# Patient Record
Sex: FEMALE | Race: OTHER | HISPANIC OR LATINO | URBAN - METROPOLITAN AREA
[De-identification: names, ages, dates, MRNs, and addresses within clinical notes are randomized per-mention and may not be internally consistent; named-entity substitution may affect disease eponyms.]

---

## 2017-03-07 ENCOUNTER — EMERGENCY (EMERGENCY)
Facility: HOSPITAL | Age: 67
LOS: 1 days | Discharge: LEFT BEFORE DISCHARGE | End: 2017-03-07
Admitting: EMERGENCY MEDICINE

## 2017-03-07 NOTE — ED ADULT NURSE NOTE - CAS ED LWBS REASON YN
patient was registered and when called to be triaged patient did not answer.  Patient was called 2 times by RN crissy and third time patient was called with again no answer.

## 2017-03-08 PROBLEM — Z00.00 ENCOUNTER FOR PREVENTIVE HEALTH EXAMINATION: Status: ACTIVE | Noted: 2017-03-08

## 2017-04-03 ENCOUNTER — APPOINTMENT (OUTPATIENT)
Dept: ORTHOPEDIC SURGERY | Facility: CLINIC | Age: 67
End: 2017-04-03

## 2017-04-21 ENCOUNTER — OUTPATIENT (OUTPATIENT)
Dept: OUTPATIENT SERVICES | Facility: HOSPITAL | Age: 67
LOS: 1 days | End: 2017-04-21
Payer: MEDICAID

## 2017-04-21 DIAGNOSIS — Z51.89 ENCOUNTER FOR OTHER SPECIFIED AFTERCARE: ICD-10-CM

## 2017-04-21 DIAGNOSIS — S52.91XD UNSPECIFIED FRACTURE OF RIGHT FOREARM, SUBSEQUENT ENCOUNTER FOR CLOSED FRACTURE WITH ROUTINE HEALING: ICD-10-CM

## 2017-04-22 DIAGNOSIS — M79.641 PAIN IN RIGHT HAND: ICD-10-CM

## 2017-04-22 DIAGNOSIS — M25.531 PAIN IN RIGHT WRIST: ICD-10-CM

## 2017-04-22 DIAGNOSIS — M25.631 STIFFNESS OF RIGHT WRIST, NOT ELSEWHERE CLASSIFIED: ICD-10-CM

## 2021-04-20 ENCOUNTER — APPOINTMENT (OUTPATIENT)
Dept: GASTROENTEROLOGY | Facility: CLINIC | Age: 71
End: 2021-04-20
Payer: COMMERCIAL

## 2021-04-20 VITALS
DIASTOLIC BLOOD PRESSURE: 70 MMHG | WEIGHT: 140 LBS | SYSTOLIC BLOOD PRESSURE: 120 MMHG | RESPIRATION RATE: 12 BRPM | TEMPERATURE: 97.8 F | BODY MASS INDEX: 28.22 KG/M2 | HEIGHT: 59 IN | HEART RATE: 68 BPM | OXYGEN SATURATION: 98 %

## 2021-04-20 DIAGNOSIS — Z80.0 FAMILY HISTORY OF MALIGNANT NEOPLASM OF DIGESTIVE ORGANS: ICD-10-CM

## 2021-04-20 DIAGNOSIS — Z78.9 OTHER SPECIFIED HEALTH STATUS: ICD-10-CM

## 2021-04-20 DIAGNOSIS — R19.8 OTHER SPECIFIED SYMPTOMS AND SIGNS INVOLVING THE DIGESTIVE SYSTEM AND ABDOMEN: ICD-10-CM

## 2021-04-20 DIAGNOSIS — Z12.11 ENCOUNTER FOR SCREENING FOR MALIGNANT NEOPLASM OF COLON: ICD-10-CM

## 2021-04-20 DIAGNOSIS — Z86.79 PERSONAL HISTORY OF OTHER DISEASES OF THE CIRCULATORY SYSTEM: ICD-10-CM

## 2021-04-20 PROCEDURE — 99072 ADDL SUPL MATRL&STAF TM PHE: CPT

## 2021-04-20 PROCEDURE — 99204 OFFICE O/P NEW MOD 45 MIN: CPT

## 2021-04-20 NOTE — REASON FOR VISIT
[Initial Evaluation] : an initial evaluation [FreeTextEntry1] : For rectal bleeding and screening colonoscopy evaluation

## 2021-04-20 NOTE — ASSESSMENT
[FreeTextEntry1] : Impression: Low-volume hematochezia in a patient with no recent screening colonoscopies rule out colorectal neoplasm versus possible internal hemorrhoidal bleeding.  Patient had no perianal pathology noted on today's digital rectal examination.\par \par Recommendations: Repeat high risk screening colonoscopy was advised for further evaluation of the above.  The risk versus benefits of repeat colonoscopy and intravenous sedation, and alternative testing such as virtual colonoscopy, were individually explained to the patient today in Swedish by myself who speaks Swedish.  She appeared to understand all of the above and was agreeable to proceeding with repeat colonoscopy.  Her ASA classification is 2.  She will be prepped with MiraLAX and Dulcolax tablets and is medically optimized for the proposed colonoscopy and appeared to understand all of the above instructions, information, and management plan.

## 2021-04-20 NOTE — REVIEW OF SYSTEMS
[Negative] : Heme/Lymph [As Noted in HPI] : as noted in HPI [Abdominal Pain] : no abdominal pain [Vomiting] : no vomiting [Constipation] : no constipation [Diarrhea] : no diarrhea [Heartburn] : no heartburn [Melena] : no melena [FreeTextEntry7] : Low-volume hematochezia

## 2021-04-20 NOTE — PHYSICAL EXAM
[General Appearance - Alert] : alert [General Appearance - In No Acute Distress] : in no acute distress [General Appearance - Well Nourished] : well nourished [General Appearance - Well Developed] : well developed [General Appearance - Well-Appearing] : healthy appearing [Sclera] : the sclera and conjunctiva were normal [PERRL With Normal Accommodation] : pupils were equal in size, round, and reactive to light [Extraocular Movements] : extraocular movements were intact [Outer Ear] : the ears and nose were normal in appearance [Oropharynx] : the oropharynx was normal [Neck Appearance] : the appearance of the neck was normal [Neck Cervical Mass (___cm)] : no neck mass was observed [Jugular Venous Distention Increased] : there was no jugular-venous distention [Thyroid Diffuse Enlargement] : the thyroid was not enlarged [Thyroid Nodule] : there were no palpable thyroid nodules [Auscultation Breath Sounds / Voice Sounds] : lungs were clear to auscultation bilaterally [Heart Rate And Rhythm] : heart rate was normal and rhythm regular [Heart Sounds] : normal S1 and S2 [Heart Sounds Gallop] : no gallops [Murmurs] : no murmurs [Heart Sounds Pericardial Friction Rub] : no pericardial rub [Bowel Sounds] : normal bowel sounds [Abdomen Soft] : soft [Abdomen Tenderness] : non-tender [] : no hepato-splenomegaly [Abdomen Mass (___ Cm)] : no abdominal mass palpated [Normal Sphincter Tone] : normal sphincter tone [No Rectal Mass] : no rectal mass [No CVA Tenderness] : no ~M costovertebral angle tenderness [Abnormal Walk] : normal gait [Skin Color & Pigmentation] : normal skin color and pigmentation [Musculoskeletal - Swelling] : no joint swelling seen [Skin Turgor] : normal skin turgor [Oriented To Time, Place, And Person] : oriented to person, place, and time [Internal Hemorrhoid] : no internal hemorrhoids [External Hemorrhoid] : no external hemorrhoids [Occult Blood Positive] : stool was negative for occult blood [FreeTextEntry1] : Brown Hemoccult negative stool.  No blood.  The exam was chaperoned.

## 2021-04-20 NOTE — HISTORY OF PRESENT ILLNESS
[None] : had no significant interval events [Heartburn] : denies heartburn [Nausea] : denies nausea [Vomiting] : denies vomiting [Diarrhea] : denies diarrhea [Constipation] : denies constipation [Yellow Skin Or Eyes (Jaundice)] : denies jaundice [Abdominal Pain] : denies abdominal pain [Abdominal Swelling] : denies abdominal swelling [Rectal Pain] : denies rectal pain [Wt Gain ___ Lbs] : no recent weight gain [Wt Loss ___ Lbs] : no recent weight loss [GERD] : no gastroesophageal reflux disease [Hiatus Hernia] : no hiatus hernia [Peptic Ulcer Disease] : no peptic ulcer disease [Pancreatitis] : no pancreatitis [Cholelithiasis] : no cholelithiasis [Kidney Stone] : no kidney stone [Inflammatory Bowel Disease] : no inflammatory bowel disease [Irritable Bowel Syndrome] : no irritable bowel syndrome [Diverticulitis] : no diverticulitis [Alcohol Abuse] : no alcohol abuse [Malignancy] : no malignancy [Abdominal Surgery] : no abdominal surgery [Appendectomy] : no appendectomy [Cholecystectomy] : no cholecystectomy [de-identified] : This is the patient's first visit here [de-identified] : Patient presents for initial evaluation of intermittent low-volume hematochezia and for screening colonoscopy evaluation.  She states that her last colonoscopy was done more than 15 years ago and has intermittent low-volume hematochezia with no change in bowel habits or associated abdominal or rectal pain or nausea or vomiting or anorexia or unexplained weight loss.  She has no first-degree relatives with either colon cancer or colon polyps. [de-identified] : Rectal bleeding

## 2021-05-13 DIAGNOSIS — Z01.818 ENCOUNTER FOR OTHER PREPROCEDURAL EXAMINATION: ICD-10-CM

## 2021-05-18 ENCOUNTER — APPOINTMENT (OUTPATIENT)
Dept: DISASTER EMERGENCY | Facility: CLINIC | Age: 71
End: 2021-05-18

## 2021-05-19 LAB — SARS-COV-2 N GENE NPH QL NAA+PROBE: NOT DETECTED

## 2021-05-21 ENCOUNTER — APPOINTMENT (OUTPATIENT)
Dept: GASTROENTEROLOGY | Facility: GI CENTER | Age: 71
End: 2021-05-21
Payer: COMMERCIAL

## 2021-05-21 ENCOUNTER — OUTPATIENT (OUTPATIENT)
Dept: OUTPATIENT SERVICES | Facility: HOSPITAL | Age: 71
LOS: 1 days | End: 2021-05-21
Payer: COMMERCIAL

## 2021-05-21 DIAGNOSIS — K62.5 HEMORRHAGE OF ANUS AND RECTUM: ICD-10-CM

## 2021-05-21 DIAGNOSIS — Z12.11 ENCOUNTER FOR SCREENING FOR MALIGNANT NEOPLASM OF COLON: ICD-10-CM

## 2021-05-21 DIAGNOSIS — K59.00 CONSTIPATION, UNSPECIFIED: ICD-10-CM

## 2021-05-21 DIAGNOSIS — K64.8 OTHER HEMORRHOIDS: ICD-10-CM

## 2021-05-21 PROCEDURE — 45378 DIAGNOSTIC COLONOSCOPY: CPT

## 2021-05-21 PROCEDURE — 45378 DIAGNOSTIC COLONOSCOPY: CPT | Mod: 33

## 2021-05-21 NOTE — PROCEDURE
[Hematochezia] : hematochezia [Change in Bowel Habits] : change in bowel habits [Procedure Explained] : The procedure was explained [Allergies Reviewed] : allergies reviewed. [Risks] : Risks [Benefits] : benefits [Alternatives] : alternatives [Bleeding] : bleeding risk [Infection] : risk of infection [Consent Obtained] : written consent was obtained prior to the procedure and is detailed in the patient's record [Patient] : the patient [Bowel Prep Kit] : the patient took the appropriate bowel preparation kit as directed [Approved Diet Followed] : the patient avoided solid foods and adhered to the approved diet list for 24 hours prior to the procedure [Automated Blood Pressure Cuff] : automated blood pressure cuff [Cardiac Monitor] : cardiac monitor [Pulse Oximeter] : pulse oximeter [Propofol ___ mg IV] : Propofol [unfilled] ~Umg intravenously [___ L/min Oxygen via NC] : [unfilled] ~Uliters/minute oxygen via nasal cannula [2] : 2 [Sedation Clearance] : the patient was cleared for moderate sedation [Time started: ___] : Start Time:  [unfilled] [Prep Qualtiy: ___] : Prep Quality:  [unfilled] [Withdrawal Time: ___] : Withdrawal Time:  [unfilled] [Time Completed: ___] : Completion Time:  [unfilled] [Performed By: ___] : Performed by:  TRI [Left Lateral Decubitus] : The patient was positioned in the left lateral decubitus position [Cecum (Landmarks/Transillum)] : and guided to the cecum which was identified by the anatomic landmarks of the appendiceal orifice and ileocecal valve and by transillumination in the right lower quadrant [No Difficulty] : without difficulty [Insufflated] : insufflated [Single Pass Needed] : after a single pass [Retroflex View] : a retroflex view of the rectum was performed [Normal] : Normal [Hemorrhoids] : hemorrhoids [Tolerated Well] : the patient tolerated the procedure well [Vital Signs Stable] : the vital signs were stable [No Complications] : There were no complications [Abnormal Rectum] : a normal rectum [External Hemorrhoids] : no external hemorrhoids [Patient Rotated Into Alternating Positions] : the patient was not rotated [de-identified] : [facility] [address] DL [id number] [de-identified] : Internal hemorrhoids noted on retroflexion. [de-identified] : Internal hemorrhoids o/w normal colonoscopy to the cecum.

## 2021-05-21 NOTE — PROCEDURE
[Hematochezia] : hematochezia [Change in Bowel Habits] : change in bowel habits [Procedure Explained] : The procedure was explained [Allergies Reviewed] : allergies reviewed. [Risks] : Risks [Benefits] : benefits [Alternatives] : alternatives [Bleeding] : bleeding risk [Infection] : risk of infection [Consent Obtained] : written consent was obtained prior to the procedure and is detailed in the patient's record [Patient] : the patient [Bowel Prep Kit] : the patient took the appropriate bowel preparation kit as directed [Approved Diet Followed] : the patient avoided solid foods and adhered to the approved diet list for 24 hours prior to the procedure [Automated Blood Pressure Cuff] : automated blood pressure cuff [Cardiac Monitor] : cardiac monitor [Pulse Oximeter] : pulse oximeter [Propofol ___ mg IV] : Propofol [unfilled] ~Umg intravenously [___ L/min Oxygen via NC] : [unfilled] ~Uliters/minute oxygen via nasal cannula [2] : 2 [Sedation Clearance] : the patient was cleared for moderate sedation [Time started: ___] : Start Time:  [unfilled] [Prep Qualtiy: ___] : Prep Quality:  [unfilled] [Withdrawal Time: ___] : Withdrawal Time:  [unfilled] [Time Completed: ___] : Completion Time:  [unfilled] [Performed By: ___] : Performed by:  TRI [Left Lateral Decubitus] : The patient was positioned in the left lateral decubitus position [Cecum (Landmarks/Transillum)] : and guided to the cecum which was identified by the anatomic landmarks of the appendiceal orifice and ileocecal valve and by transillumination in the right lower quadrant [No Difficulty] : without difficulty [Insufflated] : insufflated [Single Pass Needed] : after a single pass [Retroflex View] : a retroflex view of the rectum was performed [Normal] : Normal [Hemorrhoids] : hemorrhoids [Tolerated Well] : the patient tolerated the procedure well [Vital Signs Stable] : the vital signs were stable [No Complications] : There were no complications [Abnormal Rectum] : a normal rectum [External Hemorrhoids] : no external hemorrhoids [Patient Rotated Into Alternating Positions] : the patient was not rotated [de-identified] : [facility] [address] DL [id number] [de-identified] : Internal hemorrhoids noted on retroflexion. [de-identified] : Internal hemorrhoids o/w normal colonoscopy to the cecum.

## 2021-05-21 NOTE — REASON FOR VISIT
[Procedure: _________] : a [unfilled] procedure visit [Colonoscopy] : a colonoscopy [FreeTextEntry2] : Pt. is here for colonoscopy for rectal bleeding and constipation

## 2021-05-21 NOTE — REVIEW OF SYSTEMS
[Constipation] : constipation [Negative] : Heme/Lymph [Diarrhea] : no diarrhea [Heartburn] : no heartburn [Melena] : no melena [FreeTextEntry7] : rectal bleeding

## 2021-05-21 NOTE — PHYSICAL EXAM
[General Appearance - Alert] : alert [General Appearance - In No Acute Distress] : in no acute distress [General Appearance - Well Nourished] : well nourished [General Appearance - Well Developed] : well developed [General Appearance - Well-Appearing] : healthy appearing [Sclera] : the sclera and conjunctiva were normal [PERRL With Normal Accommodation] : pupils were equal in size, round, and reactive to light [Extraocular Movements] : extraocular movements were intact [Outer Ear] : the ears and nose were normal in appearance [Oropharynx] : the oropharynx was normal [Neck Appearance] : the appearance of the neck was normal [Neck Cervical Mass (___cm)] : no neck mass was observed [Jugular Venous Distention Increased] : there was no jugular-venous distention [Thyroid Diffuse Enlargement] : the thyroid was not enlarged [Thyroid Nodule] : there were no palpable thyroid nodules [Auscultation Breath Sounds / Voice Sounds] : lungs were clear to auscultation bilaterally [Heart Rate And Rhythm] : heart rate was normal and rhythm regular [Heart Sounds] : normal S1 and S2 [Heart Sounds Gallop] : no gallops [Murmurs] : no murmurs [Heart Sounds Pericardial Friction Rub] : no pericardial rub [Bowel Sounds] : normal bowel sounds [Abdomen Soft] : soft [Abdomen Tenderness] : non-tender [] : no hepato-splenomegaly [Abdomen Mass (___ Cm)] : no abdominal mass palpated [Normal Sphincter Tone] : normal sphincter tone [No Rectal Mass] : no rectal mass [No CVA Tenderness] : no ~M costovertebral angle tenderness [Abnormal Walk] : normal gait [Musculoskeletal - Swelling] : no joint swelling seen [Skin Color & Pigmentation] : normal skin color and pigmentation [Skin Turgor] : normal skin turgor [Oriented To Time, Place, And Person] : oriented to person, place, and time [Internal Hemorrhoid] : no internal hemorrhoids [External Hemorrhoid] : no external hemorrhoids [Occult Blood Positive] : stool was negative for occult blood [FreeTextEntry1] : Brown Hemoccult negative stool.  No blood.  The exam was chaperoned.

## 2022-08-02 ENCOUNTER — APPOINTMENT (OUTPATIENT)
Dept: GASTROENTEROLOGY | Facility: CLINIC | Age: 72
End: 2022-08-02

## 2022-08-02 PROBLEM — Z00.00 ENCOUNTER FOR PREVENTIVE HEALTH EXAMINATION: Status: ACTIVE | Noted: 2022-08-02

## 2022-09-01 ENCOUNTER — OFFICE (OUTPATIENT)
Dept: URBAN - METROPOLITAN AREA CLINIC 94 | Facility: CLINIC | Age: 72
Setting detail: OPHTHALMOLOGY
End: 2022-09-01
Payer: COMMERCIAL

## 2022-09-01 DIAGNOSIS — Z01.00: ICD-10-CM

## 2022-09-01 PROBLEM — H52.4 PRESBYOPIA: Status: ACTIVE | Noted: 2022-09-01

## 2022-09-01 PROCEDURE — 92004 COMPRE OPH EXAM NEW PT 1/>: CPT | Performed by: OPTOMETRIST

## 2022-09-01 ASSESSMENT — KERATOMETRY
OS_K2POWER_DIOPTERS: 45.25
OD_K2POWER_DIOPTERS: 46.50
OD_AXISANGLE_DEGREES: 108
OD_K1POWER_DIOPTERS: 45.25
OS_K1POWER_DIOPTERS: 44.75
OS_AXISANGLE_DEGREES: 113

## 2022-09-01 ASSESSMENT — VISUAL ACUITY
OS_BCVA: 20/25+1
OD_BCVA: 20/25-1

## 2022-09-01 ASSESSMENT — REFRACTION_MANIFEST
OS_CYLINDER: SPH
OS_VA1: 20/25
OD_SPHERE: -0.50
OS_SPHERE: -0.50
OS_ADD: +2.75
OD_ADD: +2.75
OD_VA1: 20/25
OD_CYLINDER: SPH

## 2022-09-01 ASSESSMENT — REFRACTION_AUTOREFRACTION
OS_AXIS: 054
OS_SPHERE: -0.50
OD_SPHERE: -0.50
OD_CYLINDER: -0.75
OD_AXIS: 025
OS_CYLINDER: -0.50

## 2022-09-01 ASSESSMENT — SPHEQUIV_DERIVED
OD_SPHEQUIV: -0.875
OS_SPHEQUIV: -0.75

## 2022-09-01 ASSESSMENT — CONFRONTATIONAL VISUAL FIELD TEST (CVF)
OD_FINDINGS: FULL
OS_FINDINGS: FULL

## 2022-09-01 ASSESSMENT — TONOMETRY
OD_IOP_MMHG: 17
OS_IOP_MMHG: 15

## 2022-09-01 ASSESSMENT — AXIALLENGTH_DERIVED
OD_AL: 23.0722
OS_AL: 23.336

## 2023-07-28 ENCOUNTER — INPATIENT (INPATIENT)
Facility: HOSPITAL | Age: 73
LOS: 2 days | Discharge: HOME | DRG: 83 | End: 2023-07-31
Attending: SURGERY | Admitting: SURGERY
Payer: SELF-PAY

## 2023-07-28 VITALS
DIASTOLIC BLOOD PRESSURE: 68 MMHG | TEMPERATURE: 98 F | RESPIRATION RATE: 16 BRPM | OXYGEN SATURATION: 97 % | WEIGHT: 154.98 LBS | HEART RATE: 71 BPM | SYSTOLIC BLOOD PRESSURE: 143 MMHG

## 2023-07-28 DIAGNOSIS — I60.9 NONTRAUMATIC SUBARACHNOID HEMORRHAGE, UNSPECIFIED: ICD-10-CM

## 2023-07-28 LAB
ABO RH CONFIRMATION: SIGNIFICANT CHANGE UP
ALBUMIN SERPL ELPH-MCNC: 3.9 G/DL — SIGNIFICANT CHANGE UP (ref 3.3–5.2)
ALP SERPL-CCNC: 90 U/L — SIGNIFICANT CHANGE UP (ref 40–120)
ALT FLD-CCNC: 20 U/L — SIGNIFICANT CHANGE UP
ANION GAP SERPL CALC-SCNC: 11 MMOL/L — SIGNIFICANT CHANGE UP (ref 5–17)
ANION GAP SERPL CALC-SCNC: 13 MMOL/L — SIGNIFICANT CHANGE UP (ref 5–17)
APTT BLD: 28.6 SEC — SIGNIFICANT CHANGE UP (ref 24.5–35.6)
AST SERPL-CCNC: 49 U/L — HIGH
BASOPHILS # BLD AUTO: 0.07 K/UL — SIGNIFICANT CHANGE UP (ref 0–0.2)
BASOPHILS NFR BLD AUTO: 0.4 % — SIGNIFICANT CHANGE UP (ref 0–2)
BILIRUB SERPL-MCNC: 0.6 MG/DL — SIGNIFICANT CHANGE UP (ref 0.4–2)
BLD GP AB SCN SERPL QL: SIGNIFICANT CHANGE UP
BUN SERPL-MCNC: 15.1 MG/DL — SIGNIFICANT CHANGE UP (ref 8–20)
BUN SERPL-MCNC: 16.5 MG/DL — SIGNIFICANT CHANGE UP (ref 8–20)
CALCIUM SERPL-MCNC: 8.5 MG/DL — SIGNIFICANT CHANGE UP (ref 8.4–10.5)
CALCIUM SERPL-MCNC: 8.7 MG/DL — SIGNIFICANT CHANGE UP (ref 8.4–10.5)
CHLORIDE SERPL-SCNC: 104 MMOL/L — SIGNIFICANT CHANGE UP (ref 96–108)
CHLORIDE SERPL-SCNC: 104 MMOL/L — SIGNIFICANT CHANGE UP (ref 96–108)
CO2 SERPL-SCNC: 23 MMOL/L — SIGNIFICANT CHANGE UP (ref 22–29)
CO2 SERPL-SCNC: 25 MMOL/L — SIGNIFICANT CHANGE UP (ref 22–29)
CREAT SERPL-MCNC: 0.46 MG/DL — LOW (ref 0.5–1.3)
CREAT SERPL-MCNC: 0.46 MG/DL — LOW (ref 0.5–1.3)
EGFR: 100 ML/MIN/1.73M2 — SIGNIFICANT CHANGE UP
EGFR: 100 ML/MIN/1.73M2 — SIGNIFICANT CHANGE UP
EOSINOPHIL # BLD AUTO: 0.02 K/UL — SIGNIFICANT CHANGE UP (ref 0–0.5)
EOSINOPHIL NFR BLD AUTO: 0.1 % — SIGNIFICANT CHANGE UP (ref 0–6)
GLUCOSE SERPL-MCNC: 120 MG/DL — HIGH (ref 70–99)
GLUCOSE SERPL-MCNC: 123 MG/DL — HIGH (ref 70–99)
HCT VFR BLD CALC: 38.5 % — SIGNIFICANT CHANGE UP (ref 34.5–45)
HGB BLD-MCNC: 12.1 G/DL — SIGNIFICANT CHANGE UP (ref 11.5–15.5)
IMM GRANULOCYTES NFR BLD AUTO: 0.7 % — SIGNIFICANT CHANGE UP (ref 0–0.9)
INR BLD: 1.06 RATIO — SIGNIFICANT CHANGE UP (ref 0.85–1.18)
LYMPHOCYTES # BLD AUTO: 1.1 K/UL — SIGNIFICANT CHANGE UP (ref 1–3.3)
LYMPHOCYTES # BLD AUTO: 6.3 % — LOW (ref 13–44)
MCHC RBC-ENTMCNC: 28 PG — SIGNIFICANT CHANGE UP (ref 27–34)
MCHC RBC-ENTMCNC: 31.4 GM/DL — LOW (ref 32–36)
MCV RBC AUTO: 89.1 FL — SIGNIFICANT CHANGE UP (ref 80–100)
MONOCYTES # BLD AUTO: 1.01 K/UL — HIGH (ref 0–0.9)
MONOCYTES NFR BLD AUTO: 5.8 % — SIGNIFICANT CHANGE UP (ref 2–14)
NEUTROPHILS # BLD AUTO: 15.1 K/UL — HIGH (ref 1.8–7.4)
NEUTROPHILS NFR BLD AUTO: 86.7 % — HIGH (ref 43–77)
PLATELET # BLD AUTO: 230 K/UL — SIGNIFICANT CHANGE UP (ref 150–400)
POTASSIUM SERPL-MCNC: 3.8 MMOL/L — SIGNIFICANT CHANGE UP (ref 3.5–5.3)
POTASSIUM SERPL-MCNC: 6 MMOL/L — HIGH (ref 3.5–5.3)
POTASSIUM SERPL-SCNC: 3.8 MMOL/L — SIGNIFICANT CHANGE UP (ref 3.5–5.3)
POTASSIUM SERPL-SCNC: 6 MMOL/L — HIGH (ref 3.5–5.3)
PROT SERPL-MCNC: 8.1 G/DL — SIGNIFICANT CHANGE UP (ref 6.6–8.7)
PROTHROM AB SERPL-ACNC: 11.7 SEC — SIGNIFICANT CHANGE UP (ref 9.5–13)
RBC # BLD: 4.32 M/UL — SIGNIFICANT CHANGE UP (ref 3.8–5.2)
RBC # FLD: 12.9 % — SIGNIFICANT CHANGE UP (ref 10.3–14.5)
SODIUM SERPL-SCNC: 139 MMOL/L — SIGNIFICANT CHANGE UP (ref 135–145)
SODIUM SERPL-SCNC: 140 MMOL/L — SIGNIFICANT CHANGE UP (ref 135–145)
WBC # BLD: 17.43 K/UL — HIGH (ref 3.8–10.5)
WBC # FLD AUTO: 17.43 K/UL — HIGH (ref 3.8–10.5)

## 2023-07-28 PROCEDURE — 72125 CT NECK SPINE W/O DYE: CPT | Mod: 26,MA

## 2023-07-28 PROCEDURE — 99222 1ST HOSP IP/OBS MODERATE 55: CPT

## 2023-07-28 PROCEDURE — 70450 CT HEAD/BRAIN W/O DYE: CPT | Mod: 26,77

## 2023-07-28 PROCEDURE — 71045 X-RAY EXAM CHEST 1 VIEW: CPT | Mod: 26

## 2023-07-28 PROCEDURE — 93010 ELECTROCARDIOGRAM REPORT: CPT

## 2023-07-28 PROCEDURE — 99291 CRITICAL CARE FIRST HOUR: CPT

## 2023-07-28 PROCEDURE — 70498 CT ANGIOGRAPHY NECK: CPT | Mod: 26

## 2023-07-28 PROCEDURE — 70450 CT HEAD/BRAIN W/O DYE: CPT | Mod: 26,MA

## 2023-07-28 PROCEDURE — 72170 X-RAY EXAM OF PELVIS: CPT | Mod: 26

## 2023-07-28 RX ORDER — LEVOTHYROXINE SODIUM 125 MCG
1 TABLET ORAL
Refills: 0 | DISCHARGE

## 2023-07-28 RX ORDER — POTASSIUM CHLORIDE 20 MEQ
10 PACKET (EA) ORAL
Refills: 0 | Status: COMPLETED | OUTPATIENT
Start: 2023-07-28 | End: 2023-07-28

## 2023-07-28 RX ORDER — LEVETIRACETAM 250 MG/1
1000 TABLET, FILM COATED ORAL ONCE
Refills: 0 | Status: COMPLETED | OUTPATIENT
Start: 2023-07-28 | End: 2023-07-28

## 2023-07-28 RX ORDER — ACETAMINOPHEN 500 MG
1000 TABLET ORAL EVERY 6 HOURS
Refills: 0 | Status: COMPLETED | OUTPATIENT
Start: 2023-07-28 | End: 2023-07-29

## 2023-07-28 RX ORDER — SODIUM CHLORIDE 9 MG/ML
1000 INJECTION INTRAMUSCULAR; INTRAVENOUS; SUBCUTANEOUS
Refills: 0 | Status: DISCONTINUED | OUTPATIENT
Start: 2023-07-28 | End: 2023-07-28

## 2023-07-28 RX ORDER — ACETAMINOPHEN 500 MG
975 TABLET ORAL ONCE
Refills: 0 | Status: COMPLETED | OUTPATIENT
Start: 2023-07-28 | End: 2023-07-28

## 2023-07-28 RX ORDER — LEVOTHYROXINE SODIUM 125 MCG
25 TABLET ORAL DAILY
Refills: 0 | Status: DISCONTINUED | OUTPATIENT
Start: 2023-07-28 | End: 2023-07-31

## 2023-07-28 RX ORDER — ONDANSETRON 8 MG/1
4 TABLET, FILM COATED ORAL ONCE
Refills: 0 | Status: COMPLETED | OUTPATIENT
Start: 2023-07-28 | End: 2023-07-28

## 2023-07-28 RX ORDER — LEVETIRACETAM 250 MG/1
500 TABLET, FILM COATED ORAL
Refills: 0 | Status: DISCONTINUED | OUTPATIENT
Start: 2023-07-29 | End: 2023-07-29

## 2023-07-28 RX ORDER — MAGNESIUM SULFATE 500 MG/ML
2 VIAL (ML) INJECTION ONCE
Refills: 0 | Status: COMPLETED | OUTPATIENT
Start: 2023-07-28 | End: 2023-07-28

## 2023-07-28 RX ADMIN — Medication 100 MILLIEQUIVALENT(S): at 19:28

## 2023-07-28 RX ADMIN — SODIUM CHLORIDE 75 MILLILITER(S): 9 INJECTION INTRAMUSCULAR; INTRAVENOUS; SUBCUTANEOUS at 12:53

## 2023-07-28 RX ADMIN — Medication 1000 MILLIGRAM(S): at 18:50

## 2023-07-28 RX ADMIN — ONDANSETRON 4 MILLIGRAM(S): 8 TABLET, FILM COATED ORAL at 08:51

## 2023-07-28 RX ADMIN — Medication 400 MILLIGRAM(S): at 23:53

## 2023-07-28 RX ADMIN — LEVETIRACETAM 400 MILLIGRAM(S): 250 TABLET, FILM COATED ORAL at 09:58

## 2023-07-28 RX ADMIN — Medication 100 MILLIEQUIVALENT(S): at 20:33

## 2023-07-28 RX ADMIN — Medication 975 MILLIGRAM(S): at 08:50

## 2023-07-28 RX ADMIN — Medication 25 MICROGRAM(S): at 10:00

## 2023-07-28 RX ADMIN — Medication 150 GRAM(S): at 18:58

## 2023-07-28 RX ADMIN — Medication 400 MILLIGRAM(S): at 17:59

## 2023-07-28 RX ADMIN — LEVETIRACETAM 1000 MILLIGRAM(S): 250 TABLET, FILM COATED ORAL at 12:50

## 2023-07-28 RX ADMIN — Medication 975 MILLIGRAM(S): at 08:51

## 2023-07-28 RX ADMIN — Medication 100 MILLIEQUIVALENT(S): at 21:51

## 2023-07-28 RX ADMIN — LEVETIRACETAM 400 MILLIGRAM(S): 250 TABLET, FILM COATED ORAL at 18:32

## 2023-07-28 NOTE — H&P ADULT - ATTENDING COMMENTS
I have seen and examined the patient at 1030 hrs  Details add above, ground level fall , after chocking on rice.    On evaluation.  ABCD Intact  HD normal  GCS [id number]    Images reviewed  Lyudmila, Reanna, occipital bone fx.    A/P  Fall, Reanna, Lyudmila  Admit to trauma to SICU  serial neuro exams  Repeat CT in 4-6 hrs  Agree with CTA  Neurosurgery consult.  PT  SCD, chemoprophylaxes after 24 hrs after stable CT  Plan discussed with patient and famille,  all questions answered other satisfaction.

## 2023-07-28 NOTE — CONSULT NOTE ADULT - SUBJECTIVE AND OBJECTIVE BOX
72F PMH Hypothyroid P/W following an event of chocking on rice leading to lightheadedness and pre-syncopal event landing on back of her head around 530AM. Similar event in the past on chocking on rice. + N/V x 3 over 20 mins. No LOC. + Head ache at back of head. Per Pt "felt my blood pressure fall".    PMH/PSHX/SHx: See above  No Known Allergies    Otpt Meds: Levothyroxine 25 MICROGram(s) Oral daily    VS:  T(C): 36.8 (07-28-23 @ 06:49), Max: 36.8 (07-28-23 @ 06:49)  HR: 76 (07-28-23 @ 09:29) (71 - 76)  BP: 146/69 (07-28-23 @ 09:29) (143/68 - 146/69)  RR: 18 (07-28-23 @ 09:29) (16 - 18)  SpO2: 100% (07-28-23 @ 09:29) (97% - 100%)    Exam:    Gen:  Skin:  Cardio/Lung:  Abd:  Ext:  :  Neuro:    Labs:  CTH 7/28: Acute bifrontal sulcal subarachnoid hemorrhage. Acute left frontotemporal subdural hemorrhage measures up to 7 mm in greatest depth. Thin acuteinterhemispheric subdural hemorrhage. Nondisplaced left occipital bone fracture extends to the opisthion. Tiny extra-axial hemorrhage subjacent to the fracture along the floor of the posterior fossa. No acute parenchymal hemorrhage or brain edema.    CT C spine 7/28:  Nondisplaced fracture involving the left suboccipital   calvarium.                          12.1   17.43 )-----------( 230      ( 28 Jul 2023 09:01 )             38.5     07-28    140  |  104  |  15.1  ----------------------------<  123<H>  3.8   |  25.0  |  0.46<L>    Ca    8.5      28 Jul 2023 09:50    TPro  8.1  /  Alb  3.9  /  TBili  0.6  /  DBili  x   /  AST  49<H>  /  ALT  20  /  AlkPhos  90  07-28    PT/INR - ( 28 Jul 2023 09:44 )   PT: 11.7 sec;   INR: 1.06 ratio         PTT - ( 28 Jul 2023 09:44 )  PTT:28.6 sec      A/P: 72F w/ above PMH P/W SDH /  SAH / Occipital bone fx-non-displaced 2nd to Syncopal event.    -Neuro Cks q 2  -Rpt CTH in 6 hrs than 24 hrs  -No ASA or A/C  -Trauma Consult given fall  -CTA H/N given SAH    -No acute surgical intervention at this time    Diss w/ attending 72F PMH Hypothyroid P/W following an event of chocking on rice leading to lightheadedness and pre-syncopal event landing on back of her head around 530AM. Similar event in the past on chocking on rice. + N/V x 3 over 20 mins. No LOC. + Headache at back of head. Per Pt "felt my blood pressure fall". Per Pt, usually have issues swallowing due to her thyroid issues and needs to chew food properly No CP or HA or visual issues. In ED, CTH w/ SDH / SAH / occipital bone. Labs w/ WBC 17.    GCS: 15  / Trauma SAH     PMH/PSHX/SHx: See above  No Known Allergies    Otpt Meds: Levothyroxine 25 MICROGram(s) Oral daily    VS:  T(C): 36.8 (07-28-23 @ 06:49), Max: 36.8 (07-28-23 @ 06:49)  HR: 76 (07-28-23 @ 09:29) (71 - 76)  BP: 146/69 (07-28-23 @ 09:29) (143/68 - 146/69)  RR: 18 (07-28-23 @ 09:29) (16 - 18)  SpO2: 100% (07-28-23 @ 09:29) (97% - 100%)    Exam:  Gen: NAD  Skin: Warm/Intact  Cardio/Lung: S1,2 RRR CTA, No Wheezing  Abd: S/NT/ND  Ext: No edema  : No August  Neuro: A/O x 3, Clear and Intact Speech, PEERLA/EOMI, MANSFIELD x 4 5/5, No Drift    Labs:  CTH 7/28: Acute bifrontal sulcal subarachnoid hemorrhage. Acute left frontotemporal subdural hemorrhage measures up to 7 mm in greatest depth. Thin acuteinterhemispheric subdural hemorrhage. Nondisplaced left occipital bone fracture extends to the opisthion. Tiny extra-axial hemorrhage subjacent to the fracture along the floor of the posterior fossa. No acute parenchymal hemorrhage or brain edema.    CT C spine 7/28:  Nondisplaced fracture involving the left suboccipital   calvarium.                          12.1   17.43 )-----------( 230      ( 28 Jul 2023 09:01 )             38.5     07-28    140  |  104  |  15.1  ----------------------------<  123<H>  3.8   |  25.0  |  0.46<L>    Ca    8.5      28 Jul 2023 09:50    TPro  8.1  /  Alb  3.9  /  TBili  0.6  /  DBili  x   /  AST  49<H>  /  ALT  20  /  AlkPhos  90  07-28    PT/INR - ( 28 Jul 2023 09:44 )   PT: 11.7 sec;   INR: 1.06 ratio         PTT - ( 28 Jul 2023 09:44 )  PTT:28.6 sec      A/P: 72F w/ above PMH P/W SDH /  SAH / Occipital bone fx-non-displaced 2nd to Syncopal event.    -Neuro Cks q 2  -Rpt CTH in 6 hrs than 24 hrs  -No ASA or A/C  -Keppra load than 500 q12 for 7 days  -Trauma Consult given fall  -CTA H/N given SAH    -No acute surgical intervention at this time    Diss w/ attending

## 2023-07-28 NOTE — ED PROVIDER NOTE - CLINICAL SUMMARY MEDICAL DECISION MAKING FREE TEXT BOX
Will do CT head and c-spine, and syncope work up. Likely situational syncope. Symptom management and reassess.

## 2023-07-28 NOTE — PATIENT PROFILE ADULT - FALL HARM RISK - HARM RISK INTERVENTIONS

## 2023-07-28 NOTE — H&P ADULT - ASSESSMENT
72 year old F, h/o hypothyroidism, status post fall, HS, no AC use, syncope prior to fall with:    Acute bifrontal sulcal subarachnoid hemorrhage.  Acute left frontotemporal subdural hemorrhage measures up to 7 mm in greatest depth.  Thin acute interhemispheric subdural hemorrhage.  Nondisplaced left occipital bone fracture extends to the opisthion.  Tiny extra-axial hemorrhage subjacent to the fracture along the floor of the posterior fossa    Admit to SICU  NSGY eval   Repeat CTH in 6 hours from previous   CTA Neck to rule out BCVI  VTE mechanical ppx only   Resume home meds   Repeat BMP as initial showed a potassium of 6, EKG normal   ECHO for syncope work up   CXR and PXR to rule out underlying injury

## 2023-07-28 NOTE — ED ADULT NURSE REASSESSMENT NOTE - NS ED NURSE REASSESS COMMENT FT1
Pt resting comfortably in stretcher, awaiting repeat head CT, awaiting ICU bed, pt and family aware of plan of care.

## 2023-07-28 NOTE — ED ADULT NURSE NOTE - NSFALLRISKINTERV_ED_ALL_ED

## 2023-07-28 NOTE — ED ADULT NURSE NOTE - NEURO ASSESSMENT
[facility], [facility] - Gastroenterology                                                                                                               Reason for consult: eval prior to cln, ibs, upper gi complaints    Requesting physician or provider: Hannah Stuart 2001   • Paresis of accommodation, bilateral       Past Surgical History:   Procedure Laterality Date   • LASIK Bilateral Sandra Musa MD   • OTHER SURGICAL HISTORY     • SINUS SURGERY    2016      Family Hx:   Family History   Problem R as needed. • naproxen 500 MG Oral Tab Take 500 mg by mouth as needed. • loratadine 10 MG Oral Tab Take 10 mg by mouth as needed.            Allergies:    Eucalyptus Oil          SHORTNESS OF BREATH  Pollen Extract          UNKNOWN    ROS:   CONS Neutrophil Absolute 3.98 1.50 - 7.70 x10(3) uL    Lymphocyte Absolute 2.28 1.00 - 4.00 x10(3) uL    Monocyte Absolute 0.54 0.10 - 1.00 x10(3) uL    Eosinophil Absolute 0.07 0.00 - 0.70 x10(3) uL    Basophil Absolute 0.02 0.00 - 0.20 x10(3) uL    Immature G reportedly normal.  Father had colon ca at young age so has been on 5 year interval.  She has chronic variability in bm w/ loose stools after coffee and improved with use of probiotic and suspect may be functional.  She has hemorrhoids during periods of co Placed This Encounter      CBC W Differential W Platelet      Tissue Transglutaminase Ab, IgA      Immunoglobulin A, Qn, Serum (IGA)      C-Reactive Protein      Sed Rate, Westergren (Automated)      Vitamin B12      Meds This Visit:  Requested Prescriptio - - -

## 2023-07-28 NOTE — ED ADULT TRIAGE NOTE - CHIEF COMPLAINT QUOTE
Pt. complaining states that she choked on rice and that her pressure drops and then became lightheaded and fell backwards and hit her head on the floor. Pt. states this has happened before when she choked on rice. Pt. states she threw up 3 times and one had a little bit of blood.  Pt. denies LOC, denies blood thinners. Pt. took Motrin PTA. A&Ox3., GCS 15.

## 2023-07-28 NOTE — ED ADULT NURSE REASSESSMENT NOTE - NS ED NURSE REASSESS COMMENT FT1
Report given to receiving RN, pt placed on portable monitor, pt aware of plan of care. Report given to receiving RN Kelly, pt placed on portable monitor, awaiting transport to floor, pt aware of plan of care.

## 2023-07-28 NOTE — H&P ADULT - NSHPPHYSICALEXAM_GEN_ALL_CORE
General NAD  HEENT: No obvious contusions or lacerations   CV: Normotensive   RS: Non labored, no chest wall tenderness   Abd: Soft, NT, ND, pelvis stable   CNS: Alert, OX3, GCS 15, Upper and lower motor strength intact, sensation intact

## 2023-07-28 NOTE — ED PROVIDER NOTE - CARE PLAN
1 Principal Discharge DX:	SAH (subarachnoid hemorrhage)  Secondary Diagnosis:	SDH (subdural hematoma)  Secondary Diagnosis:	Skull fracture

## 2023-07-28 NOTE — ED ADULT TRIAGE NOTE - LANGUAGE ASSISTANCE NEEDED
Yes-Patient/Caregiver accepts free interpretation services...
[FreeTextEntry2] : New patient visit for a new injury to B/L knees

## 2023-07-28 NOTE — ED PROVIDER NOTE - OBJECTIVE STATEMENT
A 73 yo F with no significant pmh, presents to the ED c/o occipital pain and nausea s/p fall 2/2 syncopal episode this morning 530 am. Reports Pt chocked on rice and felt reduced blood pressure and then passed out. Fell on the floor and hit head. No witnessed. Reports Pt vomited 3 times at about 20 min after the episode. Denies fevers, chills, chest pain, palpitations, shortness of breath, cough, diarrhea, hematuria, dysuria, dark stools, focal neurologic symptoms. Denies other pain or trauma.

## 2023-07-28 NOTE — ED ADULT NURSE NOTE - OBJECTIVE STATEMENT
Pt A&O x 3 c/o headache s/p fall. Pt reports choking on rice and falling. Presents with N/V. Pt alert, awake and following directions. GCS 15. NIH 0. Airway patent. Respirations even and unlabored. IV access obtained; specimens sent to lab. Pt on telemonitor. Bed locked and in lowest position. Call bell within reach. Family at bedside.

## 2023-07-28 NOTE — H&P ADULT - NSHPLABSRESULTS_GEN_ALL_CORE
.  LABS:                         12.1   17.43 )-----------( 230      ( 28 Jul 2023 09:01 )             38.5     07-28    139  |  104  |  16.5  ----------------------------<  120<H>  6.0<H>   |  23.0  |  0.46<L>    Ca    8.7      28 Jul 2023 09:01    TPro  8.1  /  Alb  3.9  /  TBili  0.6  /  DBili  x   /  AST  49<H>  /  ALT  20  /  AlkPhos  90  07-28      Urinalysis Basic - ( 28 Jul 2023 09:01 )    Color: x / Appearance: x / SG: x / pH: x  Gluc: 120 mg/dL / Ketone: x  / Bili: x / Urobili: x   Blood: x / Protein: x / Nitrite: x   Leuk Esterase: x / RBC: x / WBC x   Sq Epi: x / Non Sq Epi: x / Bacteria: x            RADIOLOGY, EKG & ADDITIONAL TESTS: Reviewed.     FINDINGS:    No hydrocephalus, midline shift, or effacement of the basal cisterns.    No acute parenchymal hemorrhage or brain edema. Moderate white matter microvascular ischemic disease.    Acute bifrontal sulcal subarachnoid hemorrhage.    Acute left frontotemporal subdural hemorrhage measures up to 7 mm in greatest depth. Thin acute interhemispheric subdural hemorrhage.    Nondisplaced left occipital bone fracture extends to the opisthion.    Tiny extra-axial hemorrhage subjacent to the fracture along the floor of the posterior fossa    Air-fluid levels in the sphenoid sinuses. Remaining visualized paranasal sinuses and mastoid air cells are clear.    IMPRESSION:    Acute bifrontal sulcal subarachnoid hemorrhage.    Acute left frontotemporal subdural hemorrhage measures up to 7 mm in greatest depth.    Thin acute interhemispheric subdural hemorrhage.    Nondisplaced left occipital bone fracture extends to the opisthion.    Tiny extra-axial hemorrhage subjacent to the fracture along the floor of the posterior fossa    No acute parenchymal hemorrhage or brain edema.

## 2023-07-28 NOTE — ED PROVIDER NOTE - ATTENDING CONTRIBUTION TO CARE
72F s/p fall, occipital hematoma on exam, brought to CT emergently for CT head/neck found to have subdural + SAH + occipital skull fracture. Started on keppra, ICH precautions, d/w neurosurg and trauma, admit to SICU

## 2023-07-28 NOTE — ED PROVIDER NOTE - FACE
Head, normocephalic, atraumatic, Face, Face within normal limits, Ears, External ears within normal limits
no lymph node enlargement

## 2023-07-28 NOTE — ED ADULT NURSE REASSESSMENT NOTE - NS ED NURSE REASSESS COMMENT FT1
Neuro PA at bedside for evaluation, brought to CT for CTA, family and pt aware of plan of care, awaiting bed assignment in ICU.

## 2023-07-28 NOTE — H&P ADULT - HISTORY OF PRESENT ILLNESS
72F, H/o Hypothyroidism, presenting after a fall this morning, at 530am, she syncopized prior to the fall, never had issues with syncope before, she noted that she vomited 3 times after the fall, c/o occipital pain, no blurred vision, denies any weakness, was able to get up and ambulate after the fall, denies chest or hip pain, denies neck pain.

## 2023-07-29 DIAGNOSIS — R55 SYNCOPE AND COLLAPSE: ICD-10-CM

## 2023-07-29 LAB
ANION GAP SERPL CALC-SCNC: 11 MMOL/L — SIGNIFICANT CHANGE UP (ref 5–17)
BASOPHILS # BLD AUTO: 0.04 K/UL — SIGNIFICANT CHANGE UP (ref 0–0.2)
BASOPHILS NFR BLD AUTO: 0.5 % — SIGNIFICANT CHANGE UP (ref 0–2)
BUN SERPL-MCNC: 8.2 MG/DL — SIGNIFICANT CHANGE UP (ref 8–20)
CALCIUM SERPL-MCNC: 8.1 MG/DL — LOW (ref 8.4–10.5)
CHLORIDE SERPL-SCNC: 110 MMOL/L — HIGH (ref 96–108)
CO2 SERPL-SCNC: 22 MMOL/L — SIGNIFICANT CHANGE UP (ref 22–29)
CREAT SERPL-MCNC: 0.39 MG/DL — LOW (ref 0.5–1.3)
EGFR: 104 ML/MIN/1.73M2 — SIGNIFICANT CHANGE UP
EOSINOPHIL # BLD AUTO: 0.07 K/UL — SIGNIFICANT CHANGE UP (ref 0–0.5)
EOSINOPHIL NFR BLD AUTO: 0.9 % — SIGNIFICANT CHANGE UP (ref 0–6)
GLUCOSE SERPL-MCNC: 101 MG/DL — HIGH (ref 70–99)
HCT VFR BLD CALC: 32.7 % — LOW (ref 34.5–45)
HCV AB S/CO SERPL IA: 0.11 S/CO — SIGNIFICANT CHANGE UP (ref 0–0.99)
HCV AB SERPL-IMP: SIGNIFICANT CHANGE UP
HGB BLD-MCNC: 10.8 G/DL — LOW (ref 11.5–15.5)
IMM GRANULOCYTES NFR BLD AUTO: 0.3 % — SIGNIFICANT CHANGE UP (ref 0–0.9)
LYMPHOCYTES # BLD AUTO: 2.03 K/UL — SIGNIFICANT CHANGE UP (ref 1–3.3)
LYMPHOCYTES # BLD AUTO: 26.9 % — SIGNIFICANT CHANGE UP (ref 13–44)
MAGNESIUM SERPL-MCNC: 2.5 MG/DL — SIGNIFICANT CHANGE UP (ref 1.6–2.6)
MCHC RBC-ENTMCNC: 29 PG — SIGNIFICANT CHANGE UP (ref 27–34)
MCHC RBC-ENTMCNC: 33 GM/DL — SIGNIFICANT CHANGE UP (ref 32–36)
MCV RBC AUTO: 87.7 FL — SIGNIFICANT CHANGE UP (ref 80–100)
MONOCYTES # BLD AUTO: 0.92 K/UL — HIGH (ref 0–0.9)
MONOCYTES NFR BLD AUTO: 12.2 % — SIGNIFICANT CHANGE UP (ref 2–14)
NEUTROPHILS # BLD AUTO: 4.46 K/UL — SIGNIFICANT CHANGE UP (ref 1.8–7.4)
NEUTROPHILS NFR BLD AUTO: 59.2 % — SIGNIFICANT CHANGE UP (ref 43–77)
PHOSPHATE SERPL-MCNC: 2.6 MG/DL — SIGNIFICANT CHANGE UP (ref 2.4–4.7)
PLATELET # BLD AUTO: 197 K/UL — SIGNIFICANT CHANGE UP (ref 150–400)
POTASSIUM SERPL-MCNC: 3.9 MMOL/L — SIGNIFICANT CHANGE UP (ref 3.5–5.3)
POTASSIUM SERPL-SCNC: 3.9 MMOL/L — SIGNIFICANT CHANGE UP (ref 3.5–5.3)
RBC # BLD: 3.73 M/UL — LOW (ref 3.8–5.2)
RBC # FLD: 13 % — SIGNIFICANT CHANGE UP (ref 10.3–14.5)
SODIUM SERPL-SCNC: 143 MMOL/L — SIGNIFICANT CHANGE UP (ref 135–145)
WBC # BLD: 7.54 K/UL — SIGNIFICANT CHANGE UP (ref 3.8–10.5)
WBC # FLD AUTO: 7.54 K/UL — SIGNIFICANT CHANGE UP (ref 3.8–10.5)

## 2023-07-29 PROCEDURE — 99254 IP/OBS CNSLTJ NEW/EST MOD 60: CPT

## 2023-07-29 PROCEDURE — 93880 EXTRACRANIAL BILAT STUDY: CPT | Mod: 26

## 2023-07-29 PROCEDURE — 70450 CT HEAD/BRAIN W/O DYE: CPT | Mod: 26

## 2023-07-29 PROCEDURE — 99222 1ST HOSP IP/OBS MODERATE 55: CPT

## 2023-07-29 PROCEDURE — 99223 1ST HOSP IP/OBS HIGH 75: CPT

## 2023-07-29 PROCEDURE — 93010 ELECTROCARDIOGRAM REPORT: CPT

## 2023-07-29 RX ORDER — ENOXAPARIN SODIUM 100 MG/ML
40 INJECTION SUBCUTANEOUS EVERY 24 HOURS
Refills: 0 | Status: DISCONTINUED | OUTPATIENT
Start: 2023-07-29 | End: 2023-07-31

## 2023-07-29 RX ORDER — SODIUM CHLORIDE 9 MG/ML
500 INJECTION, SOLUTION INTRAVENOUS ONCE
Refills: 0 | Status: COMPLETED | OUTPATIENT
Start: 2023-07-29 | End: 2023-07-29

## 2023-07-29 RX ORDER — ACETAMINOPHEN 500 MG
1000 TABLET ORAL EVERY 6 HOURS
Refills: 0 | Status: DISCONTINUED | OUTPATIENT
Start: 2023-07-29 | End: 2023-07-31

## 2023-07-29 RX ADMIN — SODIUM CHLORIDE 500 MILLILITER(S): 9 INJECTION, SOLUTION INTRAVENOUS at 17:17

## 2023-07-29 RX ADMIN — Medication 400 MILLIGRAM(S): at 11:28

## 2023-07-29 RX ADMIN — Medication 1000 MILLIGRAM(S): at 12:00

## 2023-07-29 RX ADMIN — LEVETIRACETAM 500 MILLIGRAM(S): 250 TABLET, FILM COATED ORAL at 06:04

## 2023-07-29 RX ADMIN — Medication 25 MICROGRAM(S): at 06:04

## 2023-07-29 RX ADMIN — Medication 400 MILLIGRAM(S): at 06:04

## 2023-07-29 RX ADMIN — ENOXAPARIN SODIUM 40 MILLIGRAM(S): 100 INJECTION SUBCUTANEOUS at 22:26

## 2023-07-29 NOTE — CHART NOTE - NSCHARTNOTEFT_GEN_A_CORE
Tertiary Trauma Survey (TTS)    Date of TTS: 07-29-23 @ 10:40                             Admit Date: 07-28-23 @ 09:58      Trauma Activation: Consult    List Injuries Identified to Date:    Bifrontal sulcal SAH, left frontotemporal SDH, interhemispheric SDH, small extra-axial hemorrhage sub-adjacent to the fracture along the floor of the posterior fossa    List Operative and Interventional Radiological Procedures:     None        Physical Exam:    Neurological:  GCS 15. Non-focal.  Moving all extremities.  No appreciable motor deficits    HEENT: PERRLA, no drainage or redness.     Neck: Neck supple, No JVD    Respiratory:  Trachea midline, equal chest rise.  Breath Sounds equal bilateral    Cardiovascular: Regular rate & rhythm, normal S1, S2    Gastrointestinal: Soft, non-tender, normal bowel sounds    Extremities: No peripheral edema, No cyanosis, clubbing     Vascular: Equal and normal pulses: 2+ peripheral pulses throughout    Skin: No rashes      RADIOLOGICAL FINDINGS REVIEW:    - XR AP pelvis: There is no fracture, dislocation or significant soft tissue   swelling. The bilateral sacroiliac joints and pubic symphysis are intact.   Mild degenerative changes of the visualized lower lumbar spine.    - Chest XR:  No acute pulmonary disease. No fracture.      -CT Cervical spine: nondisplaced fracture of the suboccipital calvarium    -CTH: bifrontal sulcal SAH, left frontotemporal SDH, interhemispheric SDH, small extra-axial hemorrhage sub-adjacent to the fracture along the floor of the posterior fossa, no acute parenchymal hemorrhage or brain edema.     - Repeat Head CT: stable     INCIDENTAL FINDINGS:    [ x ] No    [ ] Yes, Findings are:        [ x ] Tertiary exam complete, there are no new injuries identified

## 2023-07-29 NOTE — PROGRESS NOTE ADULT - ASSESSMENT
-pt seen and case discussed w/ Dr. Baird  -images reviewed  -recommend syncope evaluation  -  -will continue to follow       -pt seen and case discussed w/ Dr. Baird  -images reviewed  -recommend syncope evaluation  -no neurosurgical contraindications for downgrade w/ Q4 neuro checks   -will continue to follow 72F admitted w/ CTH w/ SDH / SAH / occipital bone. Labs w/ WBC 17.      -pt seen and case discussed w/ Dr. Baird  -images reviewed  -recommend syncope evaluation  -no neurosurgical contraindications for downgrade w/ Q4 neuro checks   -will continue to follow

## 2023-07-29 NOTE — CONSULT NOTE ADULT - PROBLEM SELECTOR RECOMMENDATION 9
-Unwitnessed syncopal episode. Was eating prior to episode  -Patient had LOC so unable to obtain clear story of events  -Per family no prior episodes and when found on ground no anginal symptoms   -Per ICU team had 2 episodes of SVT on monitor (brief), asymptomatic  -Trop neg  -EKG with TWI  -Echo with preserved EF  -4 week out patient monitor  -Carotid duplex pend  -Outpatient NST    INCOMPLETE -Unwitnessed syncopal episode. Was eating prior to episode  -Patient had LOC so unable to obtain clear story of events  -Per family no prior episodes and when found on ground no anginal symptoms   -Per ICU team had 2 episodes of SVT on monitor (brief), asymptomatic  -Trop neg  -EKG with TWI  -Echo with preserved EF  -4 week out patient monitor  -Carotid duplex pend  -Outpatient NST  -Speech and swallow eval for chocking   -24 more hours of tele monitoring. If no events tomorrow will sign off

## 2023-07-29 NOTE — CONSULT NOTE ADULT - ASSESSMENT
72y old  Female PMH hypothyroid. Presented with syncope after eating rice in a rush to go to work in the morning. Was found passed out on ground by her niece. According to family collateral this has never happened before. She is known to chock on food. Patient endorses she felt like her blood pressure was low and then she passed out and woke up to her niece.  She doesn't recollect much else from the incident. Niece denies she was SOB or having anginal symptoms when she found her. Found to have SAH/SDH. Cardiology consulted for syncope and SVT in the ICU prior to transfer to medical floor

## 2023-07-29 NOTE — PROGRESS NOTE ADULT - SUBJECTIVE AND OBJECTIVE BOX
SICU Daily Progress Note  =====================================================  Interval/Overnight Events:   Self terminating SVT for about 3 seconds with normal BP. Original trops negative, no palpitations felt, and electrolytes being repleted.     HPI:  72F, H/o Hypothyroidism, presenting after a fall this morning, at 530am, she syncopized prior to the fall, never had issues with syncope before, she noted that she vomited 3 times after the fall, c/o occipital pain, no blurred vision, denies any weakness, was able to get up and ambulate after the fall, denies chest or hip pain, denies neck pain.   (28 Jul 2023 09:52)      PAST MEDICAL & SURGICAL HISTORY:  Hypothyroidism      No significant past surgical history          ALLERGIES:  No Known Allergies      --------------------------------------------------------------------------------------    MEDICATIONS:    Neurologic Medications  acetaminophen   IVPB .. 1000 milliGRAM(s) IV Intermittent every 6 hours  levETIRAcetam 500 milliGRAM(s) Oral two times a day    Respiratory Medications    Cardiovascular Medications    Gastrointestinal Medications    Genitourinary Medications    Hematologic/Oncologic Medications    Antimicrobial/Immunologic Medications    Endocrine/Metabolic Medications  levothyroxine 25 MICROGram(s) Oral daily    Topical/Other Medications    --------------------------------------------------------------------------------------    VITAL SIGNS:  Vital Signs Last 24 Hrs  T(C): 36.8 (28 Jul 2023 23:27), Max: 37 (28 Jul 2023 14:52)  T(F): 98.2 (28 Jul 2023 23:27), Max: 98.6 (28 Jul 2023 14:52)  HR: 72 (29 Jul 2023 00:00) (62 - 76)  BP: 123/66 (29 Jul 2023 00:00) (101/54 - 146/69)  BP(mean): 80 (29 Jul 2023 00:00) (69 - 90)  RR: 16 (29 Jul 2023 00:00) (15 - 24)  SpO2: 100% (29 Jul 2023 00:00) (95% - 100%)    Parameters below as of 29 Jul 2023 00:00  Patient On (Oxygen Delivery Method): room air      --------------------------------------------------------------------------------------    INS AND OUTS:  I&O's Detail    28 Jul 2023 07:01  -  29 Jul 2023 00:23  --------------------------------------------------------  IN:    IV PiggyBack: 100 mL    IV PiggyBack: 300 mL    IV PiggyBack: 200 mL    IV PiggyBack: 50 mL    sodium chloride 0.9%: 450 mL  Total IN: 1100 mL    OUT:    Incontinent per Collection Bag (mL): 1000 mL  Total OUT: 1000 mL    Total NET: 100 mL        --------------------------------------------------------------------------------------    EXAM  General NAD  HEENT: No obvious contusions or lacerations   CV: Normotensive, RRR, no S1/2/murmurs/gallops/rubs   RS: Non labored, no chest wall tenderness, cta bilaterally   Abd: Soft, NT, ND, pelvis stable   Neuro: Alert, OX3, GCS 15, Upper and lower motor strength 5/5, sensation intact, no neck tenderness     --------------------------------------------------------------------------------------    LABS    LABS:  cret                        12.1   17.43 )-----------( 230      ( 28 Jul 2023 09:01 )             38.5     07-28    140  |  104  |  15.1  ----------------------------<  123<H>  3.8   |  25.0  |  0.46<L>    Ca    8.5      28 Jul 2023 09:50    TPro  8.1  /  Alb  3.9  /  TBili  0.6  /  DBili  x   /  AST  49<H>  /  ALT  20  /  AlkPhos  90  07-28    PT/INR - ( 28 Jul 2023 09:44 )   PT: 11.7 sec;   INR: 1.06 ratio         PTT - ( 28 Jul 2023 09:44 )  PTT:28.6 sec    --------------------------------------------------------------------------------------

## 2023-07-29 NOTE — CHART NOTE - NSCHARTNOTEFT_GEN_A_CORE
Patient is a 72yoF with PMH hypothyroidism who presented after a syncopal fall with headstrike, found to have acute bifrontal sulcal SAH, acute left frontotemporal SDH, thin acute interhemispheric SDH, nondisplaced left occipital bone fracture, Tiny extra-axial hemorrhage subjacent to the fracture along the floor of the posterior fossa. Patient admitted to the SICU for q1h neurochecks, neurosurgery input appreciated. Patient with brief runs of SVT that self-resolve, EKG without acute ST-T wave changes, cardiology consulted    nsx: syncope eval    cards: -4 week out patient monitor  -Carotid duplex pend  -Outpatient NST  -Speech and swallow eval for chocking   -24 more hours of tele monitoring.    Vitals:  Vital Signs Last 24 Hrs  T(C): 36.6 (29 Jul 2023 23:42), Max: 37.3 (29 Jul 2023 15:39)  T(F): 97.8 (29 Jul 2023 23:42), Max: 99.1 (29 Jul 2023 15:39)  HR: 77 (29 Jul 2023 23:42) (60 - 77)  BP: 148/72 (29 Jul 2023 23:42) (91/56 - 150/71)  BP(mean): 77 (29 Jul 2023 14:00) (68 - 88)  RR: 18 (29 Jul 2023 23:42) (13 - 22)  SpO2: 93% (29 Jul 2023 23:42) (93% - 100%)    Parameters below as of 29 Jul 2023 23:42  Patient On (Oxygen Delivery Method): room air        Labs:  07-29    143  |  110<H>  |  8.2  ----------------------------<  101<H>  3.9   |  22.0  |  0.39<L>    Ca    8.1<L>      29 Jul 2023 03:15  Phos  2.6     07-29  Mg     2.5     07-29    TPro  8.1  /  Alb  3.9  /  TBili  0.6  /  DBili  x   /  AST  49<H>  /  ALT  20  /  AlkPhos  90  07-28                            10.8   7.54  )-----------( 197      ( 29 Jul 2023 03:15 )             32.7       Exam:  Gen: pt lying in bed, alert, in NAD  Resp: unlabored  CVS: RRR  Abd: soft, NT, ND  Ext: moving all extremities spontaneously, sensation intact, pulses 2+     Plan:  Neuro: pain control PRN, q4h neurochecks, f/u carotid duplex  CVS: monitor BP/HR, maintain MAP > 65, f/u cardiology reccs (24h tele monitoring, outpatient NST)  Resp: monitor RR/So2  GI: diet  : monitor electrolytes and replete as needed, strict Is/Os  Heme: monitor CBC, start on DVT ppx this evening (24h after stable head CT)  ID: monitor temperature  Endo: monitor glucose, f/u TFTs, continue levothyroxine  MSK: PT/OT pending Patient is a 72yoF with PMH hypothyroidism who presented after a syncopal fall with headstrike, found to have acute bifrontal sulcal SAH, acute left frontotemporal SDH, thin acute interhemispheric SDH, nondisplaced left occipital bone fracture, Tiny extra-axial hemorrhage subjacent to the fracture along the floor of the posterior fossa. Patient admitted to the SICU for q1h neurochecks, neurosurgery input appreciated. Patient with brief runs of SVT that self-resolve, EKG without acute ST-T wave changes, cardiology consulted    Vitals:  Vital Signs Last 24 Hrs  T(C): 36.6 (29 Jul 2023 23:42), Max: 37.3 (29 Jul 2023 15:39)  T(F): 97.8 (29 Jul 2023 23:42), Max: 99.1 (29 Jul 2023 15:39)  HR: 77 (29 Jul 2023 23:42) (60 - 77)  BP: 148/72 (29 Jul 2023 23:42) (91/56 - 150/71)  BP(mean): 77 (29 Jul 2023 14:00) (68 - 88)  RR: 18 (29 Jul 2023 23:42) (13 - 22)  SpO2: 93% (29 Jul 2023 23:42) (93% - 100%)    Parameters below as of 29 Jul 2023 23:42  Patient On (Oxygen Delivery Method): room air        Labs:  07-29    143  |  110<H>  |  8.2  ----------------------------<  101<H>  3.9   |  22.0  |  0.39<L>    Ca    8.1<L>      29 Jul 2023 03:15  Phos  2.6     07-29  Mg     2.5     07-29    TPro  8.1  /  Alb  3.9  /  TBili  0.6  /  DBili  x   /  AST  49<H>  /  ALT  20  /  AlkPhos  90  07-28                            10.8   7.54  )-----------( 197      ( 29 Jul 2023 03:15 )             32.7       Exam:  Gen: pt lying in bed, alert, in NAD  Resp: unlabored  CVS: RRR  Abd: soft, NT, ND  Ext: moving all extremities spontaneously, sensation intact, pulses 2+     Plan:  Neuro: pain control PRN, q4h neurochecks, f/u carotid duplex  CVS: monitor BP/HR, maintain MAP > 65, f/u cardiology reccs (24h tele monitoring, outpatient NST)  Resp: monitor RR/So2  GI: diet  : monitor electrolytes and replete as needed, strict Is/Os  Heme: monitor CBC, start on DVT ppx this evening (24h after stable head CT)  ID: monitor temperature  Endo: monitor glucose, f/u TFTs, continue levothyroxine  MSK: PT/OT pending Patient is a 72yoF with PMH hypothyroidism who presented after a syncopal fall with head strike, found to have acute bifrontal sulcal SAH, acute left frontotemporal SDH, thin acute interhemispheric SDH, nondisplaced left occipital bone fracture, Tiny extra-axial hemorrhage subjacent to the fracture along the floor of the posterior fossa. Patient admitted to the SICU for q1h neuro checks, neurosurgery input appreciated. Patient with brief runs of SVT that self-resolve, EKG without acute ST-T wave changes, cardiology consulted    Vitals:  Vital Signs Last 24 Hrs  T(C): 36.6 (29 Jul 2023 23:42), Max: 37.3 (29 Jul 2023 15:39)  T(F): 97.8 (29 Jul 2023 23:42), Max: 99.1 (29 Jul 2023 15:39)  HR: 77 (29 Jul 2023 23:42) (60 - 77)  BP: 148/72 (29 Jul 2023 23:42) (91/56 - 150/71)  BP(mean): 77 (29 Jul 2023 14:00) (68 - 88)  RR: 18 (29 Jul 2023 23:42) (13 - 22)  SpO2: 93% (29 Jul 2023 23:42) (93% - 100%)    Parameters below as of 29 Jul 2023 23:42  Patient On (Oxygen Delivery Method): room air        Labs:  07-29    143  |  110<H>  |  8.2  ----------------------------<  101<H>  3.9   |  22.0  |  0.39<L>    Ca    8.1<L>      29 Jul 2023 03:15  Phos  2.6     07-29  Mg     2.5     07-29    TPro  8.1  /  Alb  3.9  /  TBili  0.6  /  DBili  x   /  AST  49<H>  /  ALT  20  /  AlkPhos  90  07-28                            10.8   7.54  )-----------( 197      ( 29 Jul 2023 03:15 )             32.7       Exam:  Gen: pt lying in bed, alert, in NAD  Resp: unlabored  CVS: RRR  Abd: soft, NT, ND  Ext: moving all extremities spontaneously, sensation intact, pulses 2+     Plan:  Neuro: pain control PRN, q4h neurochecks, f/u carotid duplex  CVS: monitor BP/HR, maintain MAP > 65, f/u cardiology reccs (24h tele monitoring, outpatient NST)  Resp: monitor RR/So2  GI: diet  : monitor electrolytes and replete as needed, strict Is/Os  Heme: monitor CBC, start on DVT ppx this evening (24h after stable head CT)  ID: monitor temperature  Endo: monitor glucose, f/u TFTs, continue levothyroxine  MSK: PT/OT pending

## 2023-07-29 NOTE — CONSULT NOTE ADULT - NS ATTEND AMEND GEN_ALL_CORE FT
NSGY Attg:    see above    patient seen and examined 7/28 am    agree with exam as above    imaging reviewed  no VST    agree with plan as above
Syncope .  24 hr Telemetry  outpaitent 4 weeks event monitor .  Speech and swallopw evasln.

## 2023-07-29 NOTE — PROGRESS NOTE ADULT - ASSESSMENT
Assessment: 72F, H/o Hypothyroidism, presenting after a fall + syncope this morning with vomiting x3 after fall.     Plan:   Neuro:   -S/p syncope + H/S   -GCS 15   -Q1 neuro checks   -F/u echo   -CT Cervical spine: nondisplaced fracture of the suboccipital calvarium  -CTH: bifrontal sulcal SAH, left frontotemporal SDH, interhemispheric SDH, small extra-axial hemorrhage sub-adjacent to the fracture along the floor of the posterior fossa, no acute parenchymal hemorrhage or brain edema.   -Repeat CTH stable   -c/w ofirmev for pain control   -Keppra 500 mg BID as per neuro     Respiratory:   -Breathing comfortably on room air   -No issues    CV:   -3 second SVT with no hemodynamic compensation, no palpitations felt   -Replete lytes   -Otherwise, vital signs normal   -F/u echo     GI:   -Tolerating regular diet     :   -Voiding   -Replete lytes   -f/u AM labs     ID:   -no current issues   -ctm     Heme:   -No current issues   - will continue to monitor h/h  - SCDs     Endo:   -Pmhx hypothyroidism   -Synthroid 25 mcg qd     Dispo: SICU

## 2023-07-29 NOTE — CHART NOTE - NSCHARTNOTEFT_GEN_A_CORE
SICU TRANSFER NOTE  -----------------------------  ICU Admission Date: 7/28/2023  Transfer Date: 07-29-23 @ 16:02    Admission Diagnosis:   Bifrontal sulcal SAH,   Left frontotemporal SDH,   Interhemispheric SDH,   Small extra-axial hemorrhage sub-adjacent to the fracture along the floor of the posterior fossa  S/p Syncope    Active Problems/injuries:   Bifrontal sulcal SAH,   Left frontotemporal SDH,   Interhemispheric SDH,   Small extra-axial hemorrhage sub-adjacent to the fracture along the floor of the posterior fossa  S/p Syncope   SVT    Procedures: None    Consultants:  Neurosurgery  Cardiology  PT/OT    Medications  acetaminophen   IVPB .. 1000 milliGRAM(s) IV Intermittent every 6 hours PRN  enoxaparin Injectable 40 milliGRAM(s) SubCutaneous every 24 hours  levothyroxine 25 MICROGram(s) Oral daily      [x] I attest I have reviewed and reconciled all medications prior to transfer    IV Fluids  sodium chloride 0.9%.: Solution, 1000 milliLiter(s) infuse at 75 mL/Hr    Indication: IVL    Antibiotics: None      I have discussed this case with Meryl Douglas MD with ACS / Trauma upon transfer and all questions regarding ICU course were answered.  The following items are to be followed up:    - Head CT x 2 now stable.  No further CT imaging unless change in Neurologic exam  - Continue q4h Neurochecks  - Pt with syncope proceeding head trauma after choking on food  - EKG with no acute ST, T-wave changes (Scanned in patient window)  - TTE with hyperdynamic with EF > 70% with mild MVR, AVR, TVR  - Cardiology consutled with 2 brief runs of aysmptomatic SVT during the overnight  - Pt placed on telemetry monitoring pending cardiac evaluation  - TFT pending  - Started on DVT prophylaxis now 24 hours post stable head CT  - PT/OT pending  - Dispo planning

## 2023-07-29 NOTE — PROGRESS NOTE ADULT - SUBJECTIVE AND OBJECTIVE BOX
HPI:  HPI:  72F, H/o Hypothyroidism, presenting after a fall this morning, at 530am, she syncopized prior to the fall, never had issues with syncope before, she noted that she vomited 3 times after the fall, c/o occipital pain, no blurred vision, denies any weakness, was able to get up and ambulate after the fall, denies chest or hip pain, denies neck pain.   (28 Jul 2023 09:52)        INTERVAL HPI/OVERNIGHT EVENTS:  72y Female s/p __ seen lying comfortably in bed. Tolerating diet. Passing gas/BM. Voiding. August in with __ clear urine. Denies headache, weakness, numbness, n/v/d, fevers, chills, chest pain, SOB.       Vital Signs Last 24 Hrs  T(C): 36.7 (29 Jul 2023 11:14), Max: 37 (28 Jul 2023 14:52)  T(F): 98.1 (29 Jul 2023 11:14), Max: 98.6 (28 Jul 2023 14:52)  HR: 60 (29 Jul 2023 12:00) (60 - 75)  BP: 134/68 (29 Jul 2023 12:00) (91/56 - 141/69)  BP(mean): 87 (29 Jul 2023 12:00) (68 - 90)  RR: 21 (29 Jul 2023 12:00) (13 - 24)  SpO2: 99% (29 Jul 2023 12:00) (95% - 100%)    Parameters below as of 29 Jul 2023 12:00  Patient On (Oxygen Delivery Method): room air          PHYSICAL EXAM:  GENERAL: NAD, well-groomed, well-developed  HEAD:  Atraumatic, normocephalic  DRAINS:   WOUND: Dressing clean dry intact  MENTAL STATUS: AAO x3; Awake/Comatose; Opens eyes spontaneously/to voice/to light touch/to noxious stimuli; Appropriately conversant without aphasia/Nonverbal; following simple commands/mimicking/not following commands  CRANIAL NERVES: PERRL; EOMI; corneals intact b/l; Dolls sign positive; blinks to threat b/l; no facial asymmetry; facial sensation grossly intact to light touch b/l;  tongue midline; palate rises symmetrically; gag reflex intact  MOTOR: strength 5/5 B/L upper and lower extremities; sensation grossly intact all extremities; DTRs 2+ intact and symmetric; negative Spicer's b/l; negative clonus b/l  CHEST/LUNG: Clear to auscultation bilaterally; no rales, rhonchi, wheezing, or rubs  HEART: +S1/+S2; Regular rate and rhythm; no murmurs, rubs, or gallops  ABDOMEN: Soft, nontender, nondistended; bowel sounds present all four quadrants  EXTREMITIES:  2+ peripheral pulses, no clubbing, cyanosis, or edema  SKIN: Warm, dry; no rashes or lesions      LABS:                        10.8   7.54  )-----------( 197      ( 29 Jul 2023 03:15 )             32.7     07-29    143  |  110<H>  |  8.2  ----------------------------<  101<H>  3.9   |  22.0  |  0.39<L>    Ca    8.1<L>      29 Jul 2023 03:15  Phos  2.6     07-29  Mg     2.5     07-29    TPro  8.1  /  Alb  3.9  /  TBili  0.6  /  DBili  x   /  AST  49<H>  /  ALT  20  /  AlkPhos  90  07-28    PT/INR - ( 28 Jul 2023 09:44 )   PT: 11.7 sec;   INR: 1.06 ratio         PTT - ( 28 Jul 2023 09:44 )  PTT:28.6 sec  Urinalysis Basic - ( 29 Jul 2023 03:15 )    Color: x / Appearance: x / SG: x / pH: x  Gluc: 101 mg/dL / Ketone: x  / Bili: x / Urobili: x   Blood: x / Protein: x / Nitrite: x   Leuk Esterase: x / RBC: x / WBC x   Sq Epi: x / Non Sq Epi: x / Bacteria: x        07-28 @ 07:01  -  07-29 @ 07:00  --------------------------------------------------------  IN: 1800 mL / OUT: 1650 mL / NET: 150 mL    07-29 @ 07:01  -  07-29 @ 13:27  --------------------------------------------------------  IN: 840 mL / OUT: 500 mL / NET: 340 mL        RADIOLOGY & ADDITIONAL TESTS:      ACC: [id number] EXAM:  CT BRAIN     PROCEDURE DATE:  07/29/2023    Impression: Decreasing left frontal subarachnoid hemorrhage compared with   7/28/2023 with residual left temporal parietal subarachnoid hemorrhage no   new hemorrhage.      ACC: [id number] EXAM:  CT BRAIN     PROCEDURE DATE:  07/28/2023    IMPRESSION:   Persistent scattered subarachnoid hemorrhage in the medial   and posterior LEFT frontal lobes, BILATERAL inferior anterior frontal   lobes as well as the LEFT temporal lobe. Mild to moderate   periventricular, deep and subcortical white matter ischemia. Tiny LEFT   subdural hematoma unchanged. Unchanged nondisplaced LEFT paramedian   occipital bone fracture.      ACC: [id number] EXAM:  CT BRAIN     PROCEDURE DATE:  07/28/2023    IMPRESSION:  Acute bifrontal sulcal subarachnoid hemorrhage.  Acute left frontotemporal subdural hemorrhage measures up to 7 mm in   greatest depth.  Thin acuteinterhemispheric subdural hemorrhage.  Nondisplaced left occipital bone fracture extends to the opisthion.  Tiny extra-axial hemorrhage subjacent to the fracture along the floor of   the posterior fossa  No acute parenchymal hemorrhage or brainedema.               INTERVAL HPI/OVERNIGHT EVENTS:  72F PMH Hypothyroid admitted s/p an event of chocking on rice leading to lightheadedness and pre-syncopal event landing on back of her head around 530AM. Similar event in the past on chocking on rice. + N/V x 3 over 20 mins. No LOC. + Headache at back of head. Per Pt "felt my blood pressure fall". Per Pt, usually have issues swallowing due to her thyroid issues and needs to chew food properly No CP or HA or visual issues. In ED, CTH w/ SDH / SAH / occipital bone. Labs w/ WBC 17.      Vital Signs Last 24 Hrs  T(C): 36.7 (29 Jul 2023 11:14), Max: 37 (28 Jul 2023 14:52)  T(F): 98.1 (29 Jul 2023 11:14), Max: 98.6 (28 Jul 2023 14:52)  HR: 60 (29 Jul 2023 12:00) (60 - 75)  BP: 134/68 (29 Jul 2023 12:00) (91/56 - 141/69)  BP(mean): 87 (29 Jul 2023 12:00) (68 - 90)  RR: 21 (29 Jul 2023 12:00) (13 - 24)  SpO2: 99% (29 Jul 2023 12:00) (95% - 100%)    Parameters below as of 29 Jul 2023 12:00  Patient On (Oxygen Delivery Method): room air          LABS:                        10.8   7.54  )-----------( 197      ( 29 Jul 2023 03:15 )             32.7     07-29    143  |  110<H>  |  8.2  ----------------------------<  101<H>  3.9   |  22.0  |  0.39<L>    Ca    8.1<L>      29 Jul 2023 03:15  Phos  2.6     07-29  Mg     2.5     07-29    TPro  8.1  /  Alb  3.9  /  TBili  0.6  /  DBili  x   /  AST  49<H>  /  ALT  20  /  AlkPhos  90  07-28    PT/INR - ( 28 Jul 2023 09:44 )   PT: 11.7 sec;   INR: 1.06 ratio         PTT - ( 28 Jul 2023 09:44 )  PTT:28.6 sec  Urinalysis Basic - ( 29 Jul 2023 03:15 )    Color: x / Appearance: x / SG: x / pH: x  Gluc: 101 mg/dL / Ketone: x  / Bili: x / Urobili: x   Blood: x / Protein: x / Nitrite: x   Leuk Esterase: x / RBC: x / WBC x   Sq Epi: x / Non Sq Epi: x / Bacteria: x        07-28 @ 07:01 - 07-29 @ 07:00  --------------------------------------------------------  IN: 1800 mL / OUT: 1650 mL / NET: 150 mL    07-29 @ 07:01 - 07-29 @ 13:27  --------------------------------------------------------  IN: 840 mL / OUT: 500 mL / NET: 340 mL        RADIOLOGY & ADDITIONAL TESTS:      ACC: [id number] EXAM:  CT BRAIN     PROCEDURE DATE:  07/29/2023    Impression: Decreasing left frontal subarachnoid hemorrhage compared with   7/28/2023 with residual left temporal parietal subarachnoid hemorrhage no   new hemorrhage.      ACC: [id number] EXAM:  CT BRAIN     PROCEDURE DATE:  07/28/2023    IMPRESSION:   Persistent scattered subarachnoid hemorrhage in the medial   and posterior LEFT frontal lobes, BILATERAL inferior anterior frontal   lobes as well as the LEFT temporal lobe. Mild to moderate   periventricular, deep and subcortical white matter ischemia. Tiny LEFT   subdural hematoma unchanged. Unchanged nondisplaced LEFT paramedian   occipital bone fracture.      ACC: [id number] EXAM:  CT BRAIN     PROCEDURE DATE:  07/28/2023    IMPRESSION:  Acute bifrontal sulcal subarachnoid hemorrhage.  Acute left frontotemporal subdural hemorrhage measures up to 7 mm in   greatest depth.  Thin acuteinterhemispheric subdural hemorrhage.  Nondisplaced left occipital bone fracture extends to the opisthion.  Tiny extra-axial hemorrhage subjacent to the fracture along the floor of   the posterior fossa  No acute parenchymal hemorrhage or brainedema.

## 2023-07-30 ENCOUNTER — TRANSCRIPTION ENCOUNTER (OUTPATIENT)
Age: 73
End: 2023-07-30

## 2023-07-30 LAB
ANION GAP SERPL CALC-SCNC: 14 MMOL/L — SIGNIFICANT CHANGE UP (ref 5–17)
BASOPHILS # BLD AUTO: 0.04 K/UL — SIGNIFICANT CHANGE UP (ref 0–0.2)
BASOPHILS NFR BLD AUTO: 0.5 % — SIGNIFICANT CHANGE UP (ref 0–2)
BUN SERPL-MCNC: 13 MG/DL — SIGNIFICANT CHANGE UP (ref 8–20)
CALCIUM SERPL-MCNC: 8.6 MG/DL — SIGNIFICANT CHANGE UP (ref 8.4–10.5)
CHLORIDE SERPL-SCNC: 101 MMOL/L — SIGNIFICANT CHANGE UP (ref 96–108)
CO2 SERPL-SCNC: 20 MMOL/L — LOW (ref 22–29)
CREAT SERPL-MCNC: 0.41 MG/DL — LOW (ref 0.5–1.3)
EGFR: 103 ML/MIN/1.73M2 — SIGNIFICANT CHANGE UP
EOSINOPHIL # BLD AUTO: 0.09 K/UL — SIGNIFICANT CHANGE UP (ref 0–0.5)
EOSINOPHIL NFR BLD AUTO: 1.1 % — SIGNIFICANT CHANGE UP (ref 0–6)
GLUCOSE SERPL-MCNC: 100 MG/DL — HIGH (ref 70–99)
HCT VFR BLD CALC: 36.3 % — SIGNIFICANT CHANGE UP (ref 34.5–45)
HGB BLD-MCNC: 11.8 G/DL — SIGNIFICANT CHANGE UP (ref 11.5–15.5)
IMM GRANULOCYTES NFR BLD AUTO: 0.4 % — SIGNIFICANT CHANGE UP (ref 0–0.9)
LYMPHOCYTES # BLD AUTO: 2.28 K/UL — SIGNIFICANT CHANGE UP (ref 1–3.3)
LYMPHOCYTES # BLD AUTO: 26.6 % — SIGNIFICANT CHANGE UP (ref 13–44)
MAGNESIUM SERPL-MCNC: 1.9 MG/DL — SIGNIFICANT CHANGE UP (ref 1.6–2.6)
MCHC RBC-ENTMCNC: 28.3 PG — SIGNIFICANT CHANGE UP (ref 27–34)
MCHC RBC-ENTMCNC: 32.5 GM/DL — SIGNIFICANT CHANGE UP (ref 32–36)
MCV RBC AUTO: 87.1 FL — SIGNIFICANT CHANGE UP (ref 80–100)
MONOCYTES # BLD AUTO: 0.88 K/UL — SIGNIFICANT CHANGE UP (ref 0–0.9)
MONOCYTES NFR BLD AUTO: 10.3 % — SIGNIFICANT CHANGE UP (ref 2–14)
NEUTROPHILS # BLD AUTO: 5.25 K/UL — SIGNIFICANT CHANGE UP (ref 1.8–7.4)
NEUTROPHILS NFR BLD AUTO: 61.1 % — SIGNIFICANT CHANGE UP (ref 43–77)
PHOSPHATE SERPL-MCNC: 2.2 MG/DL — LOW (ref 2.4–4.7)
PLATELET # BLD AUTO: 226 K/UL — SIGNIFICANT CHANGE UP (ref 150–400)
POTASSIUM SERPL-MCNC: 3.7 MMOL/L — SIGNIFICANT CHANGE UP (ref 3.5–5.3)
POTASSIUM SERPL-SCNC: 3.7 MMOL/L — SIGNIFICANT CHANGE UP (ref 3.5–5.3)
RBC # BLD: 4.17 M/UL — SIGNIFICANT CHANGE UP (ref 3.8–5.2)
RBC # FLD: 13.1 % — SIGNIFICANT CHANGE UP (ref 10.3–14.5)
SODIUM SERPL-SCNC: 135 MMOL/L — SIGNIFICANT CHANGE UP (ref 135–145)
T3 SERPL-MCNC: 65 NG/DL — LOW (ref 80–200)
T4 AB SER-ACNC: 6.1 UG/DL — SIGNIFICANT CHANGE UP (ref 4.5–12)
TSH SERPL-MCNC: 2.06 UIU/ML — SIGNIFICANT CHANGE UP (ref 0.27–4.2)
WBC # BLD: 8.57 K/UL — SIGNIFICANT CHANGE UP (ref 3.8–10.5)
WBC # FLD AUTO: 8.57 K/UL — SIGNIFICANT CHANGE UP (ref 3.8–10.5)

## 2023-07-30 PROCEDURE — 99231 SBSQ HOSP IP/OBS SF/LOW 25: CPT | Mod: GC

## 2023-07-30 PROCEDURE — 99233 SBSQ HOSP IP/OBS HIGH 50: CPT

## 2023-07-30 RX ORDER — SODIUM,POTASSIUM PHOSPHATES 278-250MG
1 POWDER IN PACKET (EA) ORAL THREE TIMES A DAY
Refills: 0 | Status: DISCONTINUED | OUTPATIENT
Start: 2023-07-30 | End: 2023-07-31

## 2023-07-30 RX ORDER — METHYLPREDNISOLONE 4 MG
1000 TABLET ORAL ONCE
Refills: 0 | Status: DISCONTINUED | OUTPATIENT
Start: 2023-07-30 | End: 2023-07-30

## 2023-07-30 RX ORDER — MAGNESIUM SULFATE 500 MG/ML
1 VIAL (ML) INJECTION ONCE
Refills: 0 | Status: COMPLETED | OUTPATIENT
Start: 2023-07-30 | End: 2023-07-30

## 2023-07-30 RX ORDER — MAGNESIUM OXIDE 400 MG ORAL TABLET 241.3 MG
800 TABLET ORAL ONCE
Refills: 0 | Status: COMPLETED | OUTPATIENT
Start: 2023-07-30 | End: 2023-07-30

## 2023-07-30 RX ADMIN — Medication 25 MICROGRAM(S): at 04:51

## 2023-07-30 RX ADMIN — Medication 1 PACKET(S): at 21:30

## 2023-07-30 RX ADMIN — MAGNESIUM OXIDE 400 MG ORAL TABLET 800 MILLIGRAM(S): 241.3 TABLET ORAL at 17:03

## 2023-07-30 RX ADMIN — Medication 1 PACKET(S): at 14:06

## 2023-07-30 RX ADMIN — ENOXAPARIN SODIUM 40 MILLIGRAM(S): 100 INJECTION SUBCUTANEOUS at 21:29

## 2023-07-30 NOTE — DISCHARGE NOTE NURSING/CASE MANAGEMENT/SOCIAL WORK - PATIENT PORTAL LINK FT
You can access the FollowMyHealth Patient Portal offered by [facility] by registering at the following website: [email or web address]. By joining [employer]’s FollowMyHealth portal, you will also be able to view your health information using other applications (apps) compatible with our system.

## 2023-07-30 NOTE — PROGRESS NOTE ADULT - PROBLEM SELECTOR PLAN 1
-Unwitnessed syncopal episode. Was eating prior to episode  -Patient had LOC so unable to obtain clear story of events  -Per family no prior episodes and when found on ground, patient denies anginal symptoms   -Per ICU team had 2 episodes of SVT on monitor (brief), asymptomatic  -Trop neg  -EKG with TWI  -Echo with preserved EF (>75%)   - Carotid dopplers negative for significant B/L carotid arteries stenosis   - Recommend Outpatient NST  - Recommend speech and swallow eval as patient choked on rice prior to arrival to hospital.   - No events on telemetry overnight for 24 hours. Currently NSR on telemetry with no events of SVT.   - Outpatient 4 week monitor once discharged     No further inpatient cardiac work up at this time.  Will sign off.  Please reconsult if needed.

## 2023-07-30 NOTE — PROGRESS NOTE ADULT - ASSESSMENT
Patient is a 72yoF with PMH hypothyroidism who presented after a syncopal fall with headstrike, found to have acute bifrontal sulcal SAH, acute left frontotemporal SDH, thin acute interhemispheric SDH, nondisplaced left occipital bone fracture, Tiny extra-axial hemorrhage subjacent to the fracture along the floor of the posterior fossa. Patient admitted to the SICU for q1h neurochecks, neurosurgery input appreciated. Patient with brief runs of SVT that self-resolve, EKG without acute ST-T wave changes, cardiology consulted    Plan:  -q4h neurochecks  -carotid duplex negative  -f/u cardiology reccs  -restarted DVT ppx 7/28 PM  -pain control  -strict Is/Os  -continue home meds  -trend labs, replete electrolytes as needed  -encourage OOB  -incentive spirometry  -DVT ppx: SCDs

## 2023-07-30 NOTE — PROGRESS NOTE ADULT - SUBJECTIVE AND OBJECTIVE BOX
Patient seen and examined at bedside. No acute events overnight, no complaints this morning, remains on tele    Vitals:  Vital Signs Last 24 Hrs  T(C): 36.6 (29 Jul 2023 23:42), Max: 37.3 (29 Jul 2023 15:39)  T(F): 97.8 (29 Jul 2023 23:42), Max: 99.1 (29 Jul 2023 15:39)  HR: 77 (29 Jul 2023 23:42) (60 - 77)  BP: 148/72 (29 Jul 2023 23:42) (91/56 - 150/71)  BP(mean): 77 (29 Jul 2023 14:00) (68 - 88)  RR: 18 (29 Jul 2023 23:42) (13 - 22)  SpO2: 93% (29 Jul 2023 23:42) (93% - 100%)    Parameters below as of 29 Jul 2023 23:42  Patient On (Oxygen Delivery Method): room air      Labs:  07-29    143  |  110<H>  |  8.2  ----------------------------<  101<H>  3.9   |  22.0  |  0.39<L>    Ca    8.1<L>      29 Jul 2023 03:15  Phos  2.6     07-29  Mg     2.5     07-29    TPro  8.1  /  Alb  3.9  /  TBili  0.6  /  DBili  x   /  AST  49<H>  /  ALT  20  /  AlkPhos  90  07-28                            10.8   7.54  )-----------( 197      ( 29 Jul 2023 03:15 )             32.7       Exam:  Gen: pt lying in bed, alert, in NAD  Resp: unlabored  CVS: RRR  Abd: soft, NT, ND  Ext: moving all extremities spontaneously, sensation intact, pulses 2+

## 2023-07-30 NOTE — OCCUPATIONAL THERAPY INITIAL EVALUATION ADULT - PERTINENT HX OF CURRENT PROBLEM, REHAB EVAL
72y old  Female PMH hypothyroid. Presented with syncope after eating rice in a rush to go to work in the morning. Was found passed out on ground by her niece. According to family collateral this has never happened before. She is known to chock on food. Patient endorses she felt like her blood pressure was low and then she passed out and woke up to her niece.  She doesn't recollect much else from the incident. Niece denies she was SOB or having anginal symptoms when she found her. Found to have SAH/SDH.

## 2023-07-30 NOTE — PROGRESS NOTE ADULT - SUBJECTIVE AND OBJECTIVE BOX
[facility]                                                         CARDIOLOGY AT [place] [address]                                                         Telephone: [phone]. Fax:[phone]                                                                             PROGRESS NOTE    Reason for follow up: syncope/ SVT   Update: No episodes of SVT or arrhythmias on telemetry. Patient was NSR on telemetry for the past 24 hours.       Review of symptoms:   Cardiac:  No chest pain. No dyspnea. No palpitations.  Respiratory: no cough. No dyspnea  Gastrointestinal: No diarrhea. No abdominal pain. No bleeding.   Neuro: No focal neuro complaints.    Vitals:  T(C): 36.8 (07-30-23 @ 09:20), Max: 37.3 (07-29-23 @ 15:39)  HR: 65 (07-30-23 @ 09:20) (60 - 77)  BP: 144/76 (07-30-23 @ 09:20) (120/62 - 150/71)  RR: 18 (07-30-23 @ 09:20) (17 - 22)  SpO2: 93% (07-30-23 @ 09:20) (93% - 100%)  Wt(kg): --  I&O's Summary    29 Jul 2023 07:01  -  30 Jul 2023 07:00  --------------------------------------------------------  IN: 840 mL / OUT: 1400 mL / NET: -560 mL      Weight (kg): 70.307 (07-28 @ 06:49)    PHYSICAL EXAM:  Appearance: Comfortable. No acute distress  HEENT:  Atraumatic. Normocephalic.  Normal oral mucosa  Neurologic: A & O x 3, no gross focal deficits.  Cardiovascular: RRR S1 S2, No murmur, no rubs/gallops. No JVD  Respiratory: Lungs clear to auscultation, unlabored   Gastrointestinal:  Soft, Non-tender, + BS  Lower Extremities: 2+ Peripheral Pulses, No clubbing, cyanosis, or edema  Psychiatry: Patient is calm. No agitation.   Skin: warm and dry.    CURRENT CARDIAC MEDICATIONS:      CURRENT OTHER MEDICATIONS:  acetaminophen   IVPB .. 1000 milliGRAM(s) IV Intermittent every 6 hours PRN Mild Pain (1 - 3), Moderate Pain (4 - 6), Severe Pain (7 - 10)  levothyroxine 25 MICROGram(s) Oral daily  enoxaparin Injectable 40 milliGRAM(s) SubCutaneous every 24 hours  magnesium sulfate  IVPB 1 Gram(s) IV Intermittent once, Stop order after: 1 Doses  potassium phosphate / sodium phosphate Powder (PHOS-NaK) 1 Packet(s) Oral three times a day      LABS:	 	  ( 28 Jul 2023 09:01 )  Troponin T  <0.01,  CPK  X    , CKMB  X    , BNP X                                  11.8   8.57  )-----------( 226      ( 30 Jul 2023 05:41 )             36.3     07-30    135  |  101  |  13.0  ----------------------------<  100<H>  3.7   |  20.0<L>  |  0.41<L>    Ca    8.6      30 Jul 2023 05:41  Phos  2.2     07-30  Mg     1.9     07-30      PT/INR/PTT ( 28 Jul 2023 09:44 )                       :                       :      11.7         :       28.6                  .        .                   .              .           .       1.06        .                                       Lipid Profile:   HgA1c:   TSH: Thyroid Stimulating Hormone, Serum: 2.06 uIU/mL      TELEMETRY: NSR   ECG: NSR TWI AVR/V1     DIAGNOSTIC TESTING:  [x ] Echocardiogram: < from: TTE Echo Limited or F/U (07.28.23 @ 20:23) >   1. Left ventricular ejection fraction, by visual estimation, is >75%.   2. Hyperdynamic global left ventricular systolic function.   3. Normal left ventricular internal cavity size.   4. Normal right ventricular size and function.   5. Normal left atrial size.   6. Normal right atrialsize.   7. Trace mitral valve regurgitation.   8. Normal trileaflet aortic valve with normal opening.   9. Mild to moderate aortic regurgitation.  10. Mild tricuspid regurgitation.  11. Mild pulmonic valve regurgitation.  12. There is no evidence of pericardial effusion.  13. Estimated pulmonary artery systolic pressure is 36.6 mmHg assuming a   right atrial pressure of 3 mmHg, which is consistent with borderline   pulmonary hypertension.    < end of copied text >    [ ]  Catheterization:  [ ] Stress Test:    OTHER:

## 2023-07-30 NOTE — CHART NOTE - NSCHARTNOTEFT_GEN_A_CORE
72F w/ hx of Hypothyroid admitted following an event of chocking on rice leading to lightheadedness and pre-syncopal event landing on back of her head around, no LOC, CTH w/ SDH / SAH / occipital bone.    -pt seen and case discussed w/ Dr. Baird  -pain control as needed, avoid over sedation  -no acute neurosurgical intervention  -neurosurgery signing off  -pt should f/u w/ Dr. Dr. Baird two weeks after discharge  -reconsult PRN

## 2023-07-30 NOTE — OCCUPATIONAL THERAPY INITIAL EVALUATION ADULT - GENERAL OBSERVATIONS, REHAB EVAL
pt received in bed and left as found,  used [id number],  c/o headache 5/10, +Tele, pt pleasant, motivated and following all commands

## 2023-07-31 ENCOUNTER — TRANSCRIPTION ENCOUNTER (OUTPATIENT)
Age: 73
End: 2023-07-31

## 2023-07-31 VITALS
SYSTOLIC BLOOD PRESSURE: 172 MMHG | OXYGEN SATURATION: 95 % | HEART RATE: 64 BPM | RESPIRATION RATE: 18 BRPM | TEMPERATURE: 98 F | DIASTOLIC BLOOD PRESSURE: 78 MMHG

## 2023-07-31 LAB
ANION GAP SERPL CALC-SCNC: 10 MMOL/L — SIGNIFICANT CHANGE UP (ref 5–17)
BUN SERPL-MCNC: 12.4 MG/DL — SIGNIFICANT CHANGE UP (ref 8–20)
CALCIUM SERPL-MCNC: 8.8 MG/DL — SIGNIFICANT CHANGE UP (ref 8.4–10.5)
CHLORIDE SERPL-SCNC: 101 MMOL/L — SIGNIFICANT CHANGE UP (ref 96–108)
CO2 SERPL-SCNC: 27 MMOL/L — SIGNIFICANT CHANGE UP (ref 22–29)
CREAT SERPL-MCNC: 0.48 MG/DL — LOW (ref 0.5–1.3)
EGFR: 99 ML/MIN/1.73M2 — SIGNIFICANT CHANGE UP
GLUCOSE SERPL-MCNC: 96 MG/DL — SIGNIFICANT CHANGE UP (ref 70–99)
MAGNESIUM SERPL-MCNC: 2.1 MG/DL — SIGNIFICANT CHANGE UP (ref 1.6–2.6)
PHOSPHATE SERPL-MCNC: 3.8 MG/DL — SIGNIFICANT CHANGE UP (ref 2.4–4.7)
POTASSIUM SERPL-MCNC: 4 MMOL/L — SIGNIFICANT CHANGE UP (ref 3.5–5.3)
POTASSIUM SERPL-SCNC: 4 MMOL/L — SIGNIFICANT CHANGE UP (ref 3.5–5.3)
SODIUM SERPL-SCNC: 138 MMOL/L — SIGNIFICANT CHANGE UP (ref 135–145)

## 2023-07-31 PROCEDURE — 99232 SBSQ HOSP IP/OBS MODERATE 35: CPT

## 2023-07-31 RX ORDER — ACETAMINOPHEN 500 MG
3 TABLET ORAL
Qty: 0 | Refills: 0 | DISCHARGE
Start: 2023-07-31

## 2023-07-31 RX ORDER — ACETAMINOPHEN 500 MG
975 TABLET ORAL EVERY 6 HOURS
Refills: 0 | Status: DISCONTINUED | OUTPATIENT
Start: 2023-07-31 | End: 2023-07-31

## 2023-07-31 RX ADMIN — Medication 25 MICROGRAM(S): at 05:12

## 2023-07-31 RX ADMIN — Medication 1 PACKET(S): at 05:12

## 2023-07-31 RX ADMIN — Medication 975 MILLIGRAM(S): at 10:31

## 2023-07-31 RX ADMIN — Medication 975 MILLIGRAM(S): at 11:39

## 2023-07-31 NOTE — PROGRESS NOTE ADULT - SUBJECTIVE AND OBJECTIVE BOX
HPI/OVERNIGHT EVENTS:  Patient remained on telemetry for 24 hrs per CARDS, with no events on monitor. NAEON. Patient assessed at bedside. Denies any chest pain, palpiations, fever/chills, N&V. GCS 15. Hemodynamically well.     MEDICATIONS  (STANDING):  enoxaparin Injectable 40 milliGRAM(s) SubCutaneous every 24 hours  levothyroxine 25 MICROGram(s) Oral daily  potassium phosphate / sodium phosphate Powder (PHOS-NaK) 1 Packet(s) Oral three times a day    MEDICATIONS  (PRN):  acetaminophen   IVPB .. 1000 milliGRAM(s) IV Intermittent every 6 hours PRN Mild Pain (1 - 3), Moderate Pain (4 - 6), Severe Pain (7 - 10)      Vital Signs Last 24 Hrs  T(C): 36.7 (30 Jul 2023 23:34), Max: 36.8 (30 Jul 2023 04:51)  T(F): 98.1 (30 Jul 2023 23:34), Max: 98.3 (30 Jul 2023 04:51)  HR: 71 (30 Jul 2023 23:34) (65 - 73)  BP: 132/66 (30 Jul 2023 23:34) (128/71 - 149/71)  BP(mean): --  RR: 18 (30 Jul 2023 23:34) (18 - 18)  SpO2: 94% (30 Jul 2023 23:34) (93% - 94%)    Parameters below as of 30 Jul 2023 23:34  Patient On (Oxygen Delivery Method): room air        Constitutional: patient resting comfortably in bed, in no acute distress  Respiratory: respirations are unlabored, no accessory muscle use, no conversational dyspnea  Neurological: GCS: 15 (4/5/6). A&O x 3; no gross sensory / motor / coordination deficits  Musculoskeletal: No joint pain, swelling or deformity; no limitation of movement      I&O's Detail    29 Jul 2023 07:01  -  30 Jul 2023 07:00  --------------------------------------------------------  IN:    IV PiggyBack: 100 mL    Oral Fluid: 740 mL  Total IN: 840 mL    OUT:    Incontinent per Collection Bag (mL): 1000 mL    Voided (mL): 400 mL  Total OUT: 1400 mL    Total NET: -560 mL      30 Jul 2023 07:01  -  31 Jul 2023 00:58  --------------------------------------------------------  IN:  Total IN: 0 mL    OUT:    Voided (mL): 800 mL  Total OUT: 800 mL    Total NET: -800 mL          LABS:                        11.8   8.57  )-----------( 226      ( 30 Jul 2023 05:41 )             36.3     07-30    135  |  101  |  13.0  ----------------------------<  100<H>  3.7   |  20.0<L>  |  0.41<L>    Ca    8.6      30 Jul 2023 05:41  Phos  2.2     07-30  Mg     1.9     07-30        Urinalysis Basic - ( 30 Jul 2023 05:41 )    Color: x / Appearance: x / SG: x / pH: x  Gluc: 100 mg/dL / Ketone: x  / Bili: x / Urobili: x   Blood: x / Protein: x / Nitrite: x   Leuk Esterase: x / RBC: x / WBC x   Sq Epi: x / Non Sq Epi: x / Bacteria: x

## 2023-07-31 NOTE — PHYSICAL THERAPY INITIAL EVALUATION ADULT - ADDITIONAL COMMENTS
Pt. lives in a house with her  with 4 DARIN and one rail and no stairs inside. Pt. was independent PTA and does not own DME.  is able to assist with all needs.

## 2023-07-31 NOTE — DISCHARGE NOTE PROVIDER - NSDCMRMEDTOKEN_GEN_ALL_CORE_FT
levothyroxine 25 mcg (0.025 mg) oral tablet: 1 tab(s) orally once a day   acetaminophen 325 mg oral tablet: 3 tab(s) orally every 6 hours  levothyroxine 25 mcg (0.025 mg) oral tablet: 1 tab(s) orally once a day  Outpatient Modified Barium Swallow: Please come to the hospital with your rice after calling and making an appointment

## 2023-07-31 NOTE — SWALLOW BEDSIDE ASSESSMENT ADULT - ADDITIONAL RECOMMENDATIONS
No further follow-up warranted at bedside  MBS: can be completed as an outpatient (pt to bring rice & other foods, if applicable, which cause difficulty)

## 2023-07-31 NOTE — DISCHARGE NOTE PROVIDER - NSDCCPCAREPLAN_GEN_ALL_CORE_FT
PRINCIPAL DISCHARGE DIAGNOSIS  Diagnosis: SAH (subarachnoid hemorrhage)  Assessment and Plan of Treatment: Follow up with Neurosurgery, Dr. Baird as outpatient in 2 weeks.      SECONDARY DISCHARGE DIAGNOSES  Diagnosis: Syncope  Assessment and Plan of Treatment: follow up with Cardiology as outpatient, number provided. Please call to schedule appointment. Will need to undergo stress test as outpatient and possible holter monitoring should symptoms recur/persist.    Diagnosis: SDH (subdural hematoma)  Assessment and Plan of Treatment:     Diagnosis: Skull fracture  Assessment and Plan of Treatment:

## 2023-07-31 NOTE — SWALLOW BEDSIDE ASSESSMENT ADULT - SLP GENERAL OBSERVATIONS
Pt received & seen seated upright at edge of bed, awake/alert, language line ID: [id number] used for French, 0/10 pain pre/post

## 2023-07-31 NOTE — PHYSICAL THERAPY INITIAL EVALUATION ADULT - PERTINENT HX OF CURRENT PROBLEM, REHAB EVAL
Patient is a 72yoF with PMH hypothyroidism who presented after a syncopal fall with headstrike, found to have acute bifrontal sulcal SAH, acute left frontotemporal SDH, thin acute interhemispheric SDH, nondisplaced left occipital bone fracture, Tiny extra-axial hemorrhage subjacent to the fracture along the floor of the posterior fossa.

## 2023-07-31 NOTE — DISCHARGE NOTE PROVIDER - NSDCQMSTAIRS_GEN_ALL_CORE
Acute Care - Physical Therapy Initial Evaluation   Una Richey     Patient Name: Mahesh Mc  : 1950  MRN: [id number]  Today's Date: 3/8/2021   Onset of Illness/Injury or Date of Surgery: 21       PT Assessment (last 12 hours)      PT Evaluation and Treatment     Row Name 2131          Physical Therapy Time and Intention    Subjective Information  complains of;fatigue  -JW     Document Type  evaluation  -JW     Mode of Treatment  physical therapy  -JW     Patient Effort  adequate  -JW     Symptoms Noted During/After Treatment  none  -JW     Row Name 21          General Information    Patient Profile Reviewed  yes  -JW     Onset of Illness/Injury or Date of Surgery  21  -     Referring Physician  Dr Eaton  -     Patient Observations  alert;cooperative;agree to therapy  -JW     Patient/Family/Caregiver Comments/Observations  pt supine, agrees to therapy with encouragement  -JW     Equipment Currently Used at Home  walker, rolling;oxygen lift chair  -JW     Pertinent History of Current Functional Problem  Patient brought to ED witih difficulty getting out of bed x2 days and generalized weakness.  Patient reports at baseline, he ambulates short distances from room to room in his home with use of walker.  Patient states he receives assistance from spouse as needed.    -JW     Existing Precautions/Restrictions  fall;oxygen therapy device and L/min 2 L/min  -JW     Risks Reviewed  patient:;increased discomfort  -JW     Benefits Reviewed  patient:;improve function;increase independence;increase strength;increase balance  -JW     Barriers to Rehab  none identified  -JW     Row Name 21          Previous Level of Function/Home Environm    Household Ambulation, Premorbid Functional Level  needs assistive device for safe performance  -JW     Community Ambulation, Premorbid Functional Level  needs assistive device for safe performance  -JW     Row Name 21           Living Environment    Current Living Arrangements  home/apartment/condo 1 story house  -     Home Accessibility  stairs to enter home  -     Lives With  spouse  -     Row Name 03/08/21 0831          Home Main Entrance    Number of Stairs, Main Entrance  three  -     Stair Railings, Main Entrance  railings on both sides of stairs  -     Row Name 03/08/21 0831          Cognition    Orientation Status (Cognition)  oriented x 3  -     Follows Commands (Cognition)  follows one-step commands;delayed response/completion;increased processing time needed;initiation impaired;physical/tactile prompts required;repetition of directions required;verbal cues/prompting required  -     Row Name 03/08/21 0831          Pain Scale: Word Pre/Post-Treatment    Pre/Posttreatment Pain Comment  no c/o pain  -[facility] Name 03/08/21 0831          Range of Motion Comprehensive    Comment, General Range of Motion  LE ROM WFL bilaterally  -[facility] Name 03/08/21 0831          Strength Comprehensive (MMT)    Comment, General Manual Muscle Testing (MMT) Assessment  LE strength 4/5 bilaterally  -[facility] Name 03/08/21 0831          Bed Mobility    Bed Mobility  supine-sit  -     Supine-Sit [place] (Bed Mobility)  contact guard;verbal cues  -     Assistive Device (Bed Mobility)  bed rails;head of bed elevated  -[facility] Name 03/08/21 0831          Transfers    Transfers  sit-stand transfer;stand-sit transfer  -     Comment (Transfers)  cues for hand placement  -     Sit-Stand [place] (Transfers)  contact guard;verbal cues  -     Stand-Sit [place] (Transfers)  contact guard;verbal cues  -[facility] Name 03/08/21 0831          Sit-Stand Transfer    Assistive Device (Sit-Stand Transfers)  walker, front-wheeled  -     Row Name 03/08/21 0831          Stand-Sit Transfer    Assistive Device (Stand-Sit Transfers)  walker, front-wheeled  -[facility] Name 03/08/21 0831          Gait/Stairs (Locomotion)     [place] Level (Gait)  contact guard;verbal cues  -     Assistive Device (Gait)  walker, front-wheeled  -     Distance in Feet (Gait)  20  -JW     Pattern (Gait)  swing-through  -JW     Deviations/Abnormal Patterns (Gait)  base of support, narrow  -JW     Bilateral Gait Deviations  forward flexed posture  -     Comment (Gait/Stairs)  patient performs gait around obstacles with direction changes without balance loss.    -     Row Name 03/08/21 0831          Plan of Care Review    Outcome Summary  PT: Physical therapy evaluation complete.  patient performs supine to sit transfer wtih CGA and sit to/from stand with CGA.  Patient performs gait with rolling walker x20 feet with cues and verbal cues for sequencing of walker.  Patient reports at baseline, he ambulates short distances within his house using rolling walker.  Patient will benefit from physical therapy to address deficits in functional mobility and activity tolerance.  Patient reports he plans to return home with spouse.  -     Row Name 03/08/21 0831          Physical Therapy Goals    Bed Mobility Goal Selection (PT)  bed mobility, PT goal 1  -     Transfer Goal Selection (PT)  transfer, PT goal 1  -     Gait Training Goal Selection (PT)  gait training, PT goal 1  -     Row Name 03/08/21 0831          Bed Mobility Goal 1 (PT)    Activity/Assistive Device (Bed Mobility Goal 1, PT)  bed mobility activities, all  -     [place] Level/Cues Needed (Bed Mobility Goal 1, PT)  supervision required  -     Time Frame (Bed Mobility Goal 1, PT)  3 days  -     Progress/Outcomes (Bed Mobility Goal 1, PT)  goal ongoing  -     Row Name 03/08/21 0831          Transfer Goal 1 (PT)    Activity/Assistive Device (Transfer Goal 1, PT)  transfers, all;walker, rolling  -     [place] Level/Cues Needed (Transfer Goal 1, PT)  supervision required  -     Time Frame (Transfer Goal 1, PT)  3 days  -     Progress/Outcome (Transfer Goal 1, PT)  goal  ongoing  -     Row Name 03/08/21 0831          Gait Training Goal 1 (PT)    Activity/Assistive Device (Gait Training Goal 1, PT)  gait (walking locomotion);assistive device use  -     [place] Level (Gait Training Goal 1, PT)  supervision required  -     Distance (Gait Training Goal 1, PT)  50  -JW     Time Frame (Gait Training Goal 1, PT)  3 days  -JW     Progress/Outcome (Gait Training Goal 1, PT)  goal ongoing  -     Row Name 03/08/21 0831          Positioning and Restraints    Pre-Treatment Position  in bed  -JW     Post Treatment Position  chair  -JW     In Chair  reclined;encouraged to call for assist;call light within reach  -     Row Name 03/08/21 0831          PT Evaluation Complexity    History, PT Evaluation Complexity  1-2 personal factors and/or comorbidities  -     Examination of Body Systems (PT Eval Complexity)  1-2 elements  -     Clinical Presentation (PT Evaluation Complexity)  stable  -     Clinical Decision Making (PT Evaluation Complexity)  low complexity  -JW     Overall Complexity (PT Evaluation Complexity)  low complexity  -     Row Name 03/08/21 0831          Therapy Plan Review/Discharge Plan (PT)    Therapy Plan Review (PT)  evaluation/treatment results reviewed;care plan/treatment goals reviewed  -       User Key  (r) = Recorded By, (t) = Taken By, (c) = Cosigned By    Initials Name Provider Type    Selena Nguyen PT Physical Therapist        Physical Therapy Education                 Title: PT OT SLP Therapies (Not Started)     Topic: Physical Therapy (Not Started)     Point: Mobility training (Done)     Learning Progress Summary           Patient Acceptance, E,TB, VU by  at 3/8/2021 1036                   Point: Precautions (Not Started)     Learner Progress:  Not documented in this visit.                      User Key     Initials Effective Dates Name Provider Type [facility] 04/03/18 -  Selena Juarez, [place] Physical Therapist PT              PT  Recommendation and Plan  Anticipated Discharge Disposition (PT): home with assist, home with home health  Outcome Summary: PT: Physical therapy evaluation complete.  patient performs supine to sit transfer wtih CGA and sit to/from stand with CGA.  Patient performs gait with rolling walker x20 feet with cues and verbal cues for sequencing of walker.  Patient reports at baseline, he ambulates short distances within his house using rolling walker.  Patient will benefit from physical therapy to address deficits in functional mobility and activity tolerance.  Patient reports he plans to return home with spouse.  Outcome Measures     Row Name 03/08/21 0831             How much help from another person do you currently need...    Turning from your back to your side while in flat bed without using bedrails?  3  -JW      Moving from lying on back to sitting on the side of a flat bed without bedrails?  3  -JW      Moving to and from a bed to a chair (including a wheelchair)?  3  -JW      Standing up from a chair using your arms (e.g., wheelchair, bedside chair)?  3  -JW      Climbing 3-5 steps with a railing?  3  -JW      To walk in hospital room?  3  -JW      AM-PAC 6 Clicks Score (PT)  18  -JW         Functional Assessment    Outcome Measure Options  AM-PAC 6 Clicks Basic Mobility (PT)  -        User Key  (r) = Recorded By, (t) = Taken By, (c) = Cosigned By    Initials Name Provider Type    Selena Nguyen PT Physical Therapist           Time Calculation:   PT Charges     Row Name 03/08/21 1038             Time Calculation    Start Time  0831  -      Stop Time  0849  -      Time Calculation (min)  18 min  -MARISA      PT Received On  03/08/21  -MARISA      PT - Next Appointment  03/09/21  -MARISA        User Key  (r) = Recorded By, (t) = Taken By, (c) = Cosigned By    Initials Name Provider Type    Selena Nguyen PT Physical Therapist        Therapy Charges for Today     Code Description Service Date Service Provider  Modifiers Qty    [id number] HC PT EVAL LOW COMPLEXITY 1 3/8/2021 Selena Juarez, PT GP 1          PT G-Codes  Outcome Measure Options: AM-PAC 6 Clicks Basic Mobility (PT)  AM-PAC 6 Clicks Score (PT): 18    Selena Juarez, PT  3/8/2021     No

## 2023-07-31 NOTE — SWALLOW BEDSIDE ASSESSMENT ADULT - COMMENTS
As per MD note: "Patient is a 72yoF with PMH hypothyroidism who presented after a syncopal fall with headstrike, found to have acute bifrontal sulcal SAH, acute left frontotemporal SDH, thin acute interhemispheric SDH, nondisplaced left occipital bone fracture, Tiny extra-axial hemorrhage subjacent to the fracture along the floor of the posterior fossa. Patient admitted to the SICU for q1h neurochecks, neurosurgery input appreciated. Patient with brief runs of SVT that self-resolve, EKG without acute ST-T wave changes, cardiology consulted, No acute events on 24 hr telemetry. Patient DVT ppx resumed. Clinically well"

## 2023-07-31 NOTE — PROGRESS NOTE ADULT - NS ATTEND AMEND GEN_ALL_CORE FT
I have seen and examined this patient with the SICU team.  No acute events overnight.  Patient appears well this morning.  No neurological deficits.  Repeat CTH is WNL.   HDS, no tachycardia or hypotension.  Saturating well on room air.  Stable for downgrade to surgical floor.
I have seen and examined this patient with the surgical team.  No acute events overnight.  Patient appears well this morning.  She endorses some headache.  Denies nausea/vomiting, vision changes, or neurological symptoms.  Is ambulating.  No further runs of SVT in past 24 hours.  Will need cardiology as outpatient.  Will plan for discharge today.
Agree with above assessment.  The patient was seen and examined by myself with the surgical PA.  The patient is with a headache otherwise no other complaints or events overnight.  The patient is on cardiac monitor without events.  Will have cardiology follow up, if cleared then d/c cardiac monitor.  The patient is otherwise trauma stable for discharge planning.
Fall.   Choking difficulty swallowing.  No events on monitor.  Outapitent  4 weeks MCOT monitor.  will sig off. please call for furtehrquestions.
GADIEL Attg:    see above    see my addendum to initial consult

## 2023-07-31 NOTE — SWALLOW BEDSIDE ASSESSMENT ADULT - SWALLOW EVAL: DIAGNOSIS
Oral & pharyngeal stage of swallow clinically unremarkable for assessed PO trials with NO overt s/s aspiration

## 2023-07-31 NOTE — PROGRESS NOTE ADULT - REASON FOR ADMISSION
SDH, SAH, L Occiptal Skull Fracture

## 2023-07-31 NOTE — DISCHARGE NOTE PROVIDER - CARE PROVIDERS DIRECT ADDRESSES
[email or web address]

## 2023-07-31 NOTE — DISCHARGE NOTE PROVIDER - NSDCFUADDAPPT_GEN_ALL_CORE_FT
Please call [phone] to make an appointment for your modified barium swallow. Please bring your rice with you. The appointment will be at the hospital.

## 2023-07-31 NOTE — DISCHARGE NOTE PROVIDER - HOSPITAL COURSE
HPI:  72F, H/o Hypothyroidism, presenting after a fall this morning, at 530am, she syncopized prior to the fall, never had issues with syncope before, she noted that she vomited 3 times after the fall, c/o occipital pain, no blurred vision, denies any weakness, was able to get up and ambulate after the fall, denies chest or hip pain, denies neck pain.   (28 Jul 2023 09:52)    Hospital Course:  Patient was admitted to the Acute Care Surgery with Bilateral frontal SAH, L frontotemporal SDH, Interhemispheric SDH, L occipital skull fracture (all traumatic). Patient observed in the SICU. Remained HD stable. Neurosurgery consulted. Conservative approach taken. Neurosurgery recommended outpatient follow up and signed off. Syncope workup undertaken in hospital and negative for any pathology that required inpatient therapy. Patient evaluated by Cardiology and outpatient follow up recommended inclusive of stress test. Carotid duplex performed and negative. Patient with 2 episodes of SVT while in hospital (asymptomatic). Patient seen and evaluated by rehab services and appropriate disposition determined for discharge. At time of discharge patient was tolerating diet, pain controlled, and expected activities of daily living performed.    Length of time preparing discharge > 30 minutes HPI:  72F, H/o Hypothyroidism, presenting after a fall this morning, at 530am, she syncopized prior to the fall, never had issues with syncope before, she noted that she vomited 3 times after the fall, c/o occipital pain, no blurred vision, denies any weakness, was able to get up and ambulate after the fall, denies chest or hip pain, denies neck pain.   (28 Jul 2023 09:52)    Hospital Course:  Patient was admitted to the Acute Care Surgery with Bilateral frontal SAH, L frontotemporal SDH, Interhemispheric SDH, L occipital skull fracture (all traumatic). Patient observed in the SICU. Remained HD stable. Neurosurgery consulted. Conservative approach taken. Neurosurgery recommended outpatient follow up and signed off. Syncope workup undertaken in hospital and negative for any pathology that required inpatient therapy. Patient evaluated by Cardiology and outpatient follow up recommended inclusive of stress test. Carotid duplex performed and negative. Patient with 2 episodes of SVT while in hospital (asymptomatic). Patient seen and evaluated by rehab services and appropriate disposition determined for discharge. At time of discharge patient was tolerating diet, pain controlled, and expected activities of daily living performed. She was seen by speech givenher concern about swallowing rice. They cleared her on bedside swallow, however given her persistent concerns she will follow-up with them for an outpatient MBS.    Length of time preparing discharge > 30 minutes

## 2023-07-31 NOTE — PROGRESS NOTE ADULT - ASSESSMENT
Patient is a 72yoF with PMH hypothyroidism who presented after a syncopal fall with headstrike, found to have acute bifrontal sulcal SAH, acute left frontotemporal SDH, thin acute interhemispheric SDH, nondisplaced left occipital bone fracture, Tiny extra-axial hemorrhage subjacent to the fracture along the floor of the posterior fossa. Patient admitted to the SICU for q1h neurochecks, neurosurgery input appreciated. Patient with brief runs of SVT that self-resolve, EKG without acute ST-T wave changes, cardiology consulted, No acute events on 24 hr telemetry. Patient DVT ppx resumed. Clinically well     Plan:  -q4h neurochecks  -carotid duplex negative  - CARDS: follow up outpatient in 4 weeks for  NST   - NSGY: signed off follow up with Dr. Baird in 2 weeks   -pain control  -strict Is/Os  -continue home meds  -trend labs, replete electrolytes as needed  -encourage OOB  -incentive spirometry  - OT: No skilled needs   - PT evaluation   -DVT ppx: SCDs, lovenox

## 2023-07-31 NOTE — DISCHARGE NOTE PROVIDER - CARE PROVIDER_API CALL
Noah Baird  Neurosurgery  [address], [address]  [place] [address]  Phone: [phone]  Fax: [phone]  Follow Up Time:     Simon Layton  Cardiology  [address]  [place] [address]  Phone: [phone]  Fax: [phone]  Follow Up Time:

## 2023-07-31 NOTE — PHYSICAL THERAPY INITIAL EVALUATION ADULT - LEVEL OF CONSCIOUSNESS, REHAB EVAL
Vaccine Information Statement(s) or the Emergency Use Authorization was given today. This has been reviewed, questions answered, and verbal consent given by Patient for injection(s) and administration of COVID-19 Immunization Moderna COVID-19.        Patient tolerated without incident. See immunization grid for documentation.  
alert

## 2023-07-31 NOTE — SWALLOW BEDSIDE ASSESSMENT ADULT - SLP PERTINENT HISTORY OF CURRENT PROBLEM
Upon ?, pt reports difficulty with rice, endorsing choking episodes, due to "not chewing it well enough or taking too much at once"

## 2023-08-01 PROBLEM — E03.9 HYPOTHYROIDISM, UNSPECIFIED: Chronic | Status: ACTIVE | Noted: 2023-07-28

## 2023-08-17 ENCOUNTER — NON-APPOINTMENT (OUTPATIENT)
Age: 73
End: 2023-08-17

## 2023-08-17 ENCOUNTER — APPOINTMENT (OUTPATIENT)
Dept: NEUROSURGERY | Facility: CLINIC | Age: 73
End: 2023-08-17
Payer: SELF-PAY

## 2023-08-17 VITALS
TEMPERATURE: 98 F | HEART RATE: 73 BPM | OXYGEN SATURATION: 97 % | SYSTOLIC BLOOD PRESSURE: 146 MMHG | DIASTOLIC BLOOD PRESSURE: 76 MMHG

## 2023-08-17 PROCEDURE — 99213 OFFICE O/P EST LOW 20 MIN: CPT

## 2023-08-17 NOTE — DATA REVIEWED
[de-identified] : ACC: [id number] EXAM: CT BRAIN ORDERED BY: ALISON SENA  PROCEDURE DATE: 07/29/2023    INTERPRETATION: CLINICAL INDICATION: Follow-up left frontal temporal parietal subarachnoid hemorrhage  5mm axial sections of the brain were obtained from base to vertex, without the intravenous administration of contrast material. Coronal and sagittal computer generated reconstructed views are available.  Comparison is made with the prior CT of 7/28/2023.  There is less subarachnoid hemorrhage identified in the left frontal lobe with residual scattered foci of subarachnoid hemorrhage in the left temporal parietal region. A small amount of left frontal subdural hemorrhage is unchanged. Ventricular and sulcal prominence is consistent with age-appropriate involutional change. Small vessel white matter ischemic changes are noted. Bone window examination is unremarkable. There has been previous bilateral lens replacement surgery.    Impression: Decreasing left frontal subarachnoid hemorrhage compared with 7/28/2023 with residual left temporal parietal subarachnoid hemorrhage no new hemorrhage.

## 2023-08-17 NOTE — REASON FOR VISIT
[Follow-Up: _____] : a [unfilled] follow-up visit [FreeTextEntry1] : Hospital Course:  Discharge Date 31-Jul-2023  Admission Date 28-Jul-2023 09:58  Reason for Admission SDH, SAH, L Occiptal Skull Fracture  Hospital Course   HPI:  72F, H/o Hypothyroidism, presenting after a fall this morning, at 530am, she  syncopized prior to the fall, never had issues with syncope before, she noted  that she vomited 3 times after the fall, c/o occipital pain, no blurred vision,  denies any weakness, was able to get up and ambulate after the fall, denies  chest or hip pain, denies neck pain. (28 Jul 2023 09:52)  Patient presents for follow up visit post hospitalization.  Patient presents with complaints of intermittent headaches that come and go since discharge.  Intermittent nausea but no vomiting.  No seizures. Denies any numbness/tingling or weakness of extremities.  She admits to mild lower back pain from landing on the coccyx which is improving.

## 2023-08-17 NOTE — ASSESSMENT
[FreeTextEntry1] : Ms. Parra presents with above history and imaging.   She is neurologically intact at today's visit.  She admits to mild headaches and nausea.  Lower back pain improving.  Repeat CT head w/o contrast to assess ICH. F/u after imaging.  No heavy lifting.  Patient has been given an opportunity to ask and have their questions answered to their satisfaction.. Patient knows to call the office if there are any new or worsening symptoms.   I, Dr. Noah Baird, personally performed the evaluation and management (E/M) services for this patient.  That E/M includes assessment of the initial examination, assessing the pertinent medical and surgical history, and establishing the plan of care.  At the time of the visit, my ACP, Bryanna Renee, actively participated in the evaluation and management services for this patient to be followed going forward in accordance with the plan documented in the clinical note.

## 2023-08-17 NOTE — PHYSICAL EXAM
[General Appearance - Alert] : alert [General Appearance - In No Acute Distress] : in no acute distress [Oriented To Time, Place, And Person] : oriented to person, place, and time [Impaired Insight] : insight and judgment were intact [Affect] : the affect was normal [Person] : oriented to person [Place] : oriented to place [Time] : oriented to time [Cranial Nerves Optic (II)] : visual acuity intact bilaterally,  pupils equal round and reactive to light [Cranial Nerves Oculomotor (III)] : extraocular motion intact [Cranial Nerves Trigeminal (V)] : facial sensation intact symmetrically [Cranial Nerves Facial (VII)] : face symmetrical [Cranial Nerves Glossopharyngeal (IX)] : tongue and palate midline [Cranial Nerves Accessory (XI - Cranial And Spinal)] : head turning and shoulder shrug symmetric [Cranial Nerves Hypoglossal (XII)] : there was no tongue deviation with protrusion [Motor Tone] : muscle tone was normal in all four extremities [Motor Strength] : muscle strength was normal in all four extremities [Sensation Tactile Decrease] : light touch was intact [Sensation Pain / Temperature Decrease] : pain and temperature was intact [Abnormal Walk] : normal gait [Balance] : balance was intact [No Visual Abnormalities] : no visible abnormailities [Normal] : normal

## 2023-09-18 ENCOUNTER — APPOINTMENT (OUTPATIENT)
Dept: NEUROSURGERY | Facility: CLINIC | Age: 73
End: 2023-09-18
Payer: SELF-PAY

## 2023-09-18 VITALS
HEART RATE: 82 BPM | SYSTOLIC BLOOD PRESSURE: 149 MMHG | WEIGHT: 140 LBS | BODY MASS INDEX: 28.22 KG/M2 | HEIGHT: 59 IN | DIASTOLIC BLOOD PRESSURE: 92 MMHG | TEMPERATURE: 97.8 F | OXYGEN SATURATION: 97 %

## 2023-09-18 DIAGNOSIS — I62.9 NONTRAUMATIC INTRACRANIAL HEMORRHAGE, UNSPECIFIED: ICD-10-CM

## 2023-09-18 PROCEDURE — 99214 OFFICE O/P EST MOD 30 MIN: CPT

## 2023-10-08 PROCEDURE — 86901 BLOOD TYPING SEROLOGIC RH(D): CPT

## 2023-10-08 PROCEDURE — 84484 ASSAY OF TROPONIN QUANT: CPT

## 2023-10-08 PROCEDURE — 85730 THROMBOPLASTIN TIME PARTIAL: CPT

## 2023-10-08 PROCEDURE — 84100 ASSAY OF PHOSPHORUS: CPT

## 2023-10-08 PROCEDURE — 93308 TTE F-UP OR LMTD: CPT

## 2023-10-08 PROCEDURE — 93005 ELECTROCARDIOGRAM TRACING: CPT

## 2023-10-08 PROCEDURE — 93880 EXTRACRANIAL BILAT STUDY: CPT

## 2023-10-08 PROCEDURE — 96375 TX/PRO/DX INJ NEW DRUG ADDON: CPT

## 2023-10-08 PROCEDURE — 99285 EMERGENCY DEPT VISIT HI MDM: CPT

## 2023-10-08 PROCEDURE — 80053 COMPREHEN METABOLIC PANEL: CPT

## 2023-10-08 PROCEDURE — 36415 COLL VENOUS BLD VENIPUNCTURE: CPT

## 2023-10-08 PROCEDURE — 85610 PROTHROMBIN TIME: CPT

## 2023-10-08 PROCEDURE — 80048 BASIC METABOLIC PNL TOTAL CA: CPT

## 2023-10-08 PROCEDURE — 86803 HEPATITIS C AB TEST: CPT

## 2023-10-08 PROCEDURE — 86900 BLOOD TYPING SEROLOGIC ABO: CPT

## 2023-10-08 PROCEDURE — 72125 CT NECK SPINE W/O DYE: CPT | Mod: MA

## 2023-10-08 PROCEDURE — 70498 CT ANGIOGRAPHY NECK: CPT | Mod: MA

## 2023-10-08 PROCEDURE — 70450 CT HEAD/BRAIN W/O DYE: CPT

## 2023-10-08 PROCEDURE — 86850 RBC ANTIBODY SCREEN: CPT

## 2023-10-08 PROCEDURE — 72170 X-RAY EXAM OF PELVIS: CPT

## 2023-10-08 PROCEDURE — 84480 ASSAY TRIIODOTHYRONINE (T3): CPT

## 2023-10-08 PROCEDURE — 84443 ASSAY THYROID STIM HORMONE: CPT

## 2023-10-08 PROCEDURE — 85025 COMPLETE CBC W/AUTO DIFF WBC: CPT

## 2023-10-08 PROCEDURE — 96374 THER/PROPH/DIAG INJ IV PUSH: CPT

## 2023-10-08 PROCEDURE — 71045 X-RAY EXAM CHEST 1 VIEW: CPT

## 2023-10-08 PROCEDURE — 83735 ASSAY OF MAGNESIUM: CPT

## 2023-10-08 PROCEDURE — 84436 ASSAY OF TOTAL THYROXINE: CPT

## 2025-02-17 NOTE — SWALLOW BEDSIDE ASSESSMENT ADULT - SWALLOW EVAL: RECOMMENDED FEEDING/EATING TECHNIQUES
Lab Results   Component Value Date    HGBA1C 6.7 (H) 08/12/2024   PTA regimen of metformin twice daily hold while inpatient  4 times daily Accu-Cheks with sliding scale insulin  Update Hemoglobin A1c  Hypoglycemia protocol   Chew food thoroughly/allow for swallow between intakes/maintain upright posture during/after eating for 30 mins/oral hygiene/position upright (90 degrees)/small sips/bites

## 2025-04-29 NOTE — DISCHARGE NOTE PROVIDER - ATTENDING ATTESTATION STATEMENT
Inpatient dialysis called to inform them of patient ready bed, 4B-08   I have personally seen and examined the patient. I have collaborated with and supervised the